# Patient Record
Sex: FEMALE | Race: WHITE | NOT HISPANIC OR LATINO | URBAN - METROPOLITAN AREA
[De-identification: names, ages, dates, MRNs, and addresses within clinical notes are randomized per-mention and may not be internally consistent; named-entity substitution may affect disease eponyms.]

---

## 2017-01-01 ENCOUNTER — AMBULATORY - HEALTHEAST (OUTPATIENT)
Dept: LAB | Facility: CLINIC | Age: 82
End: 2017-01-01

## 2017-01-01 ENCOUNTER — COMMUNICATION - HEALTHEAST (OUTPATIENT)
Dept: NURSING | Facility: CLINIC | Age: 82
End: 2017-01-01

## 2017-01-01 ENCOUNTER — COMMUNICATION - HEALTHEAST (OUTPATIENT)
Dept: FAMILY MEDICINE | Facility: CLINIC | Age: 82
End: 2017-01-01

## 2017-01-01 ENCOUNTER — OFFICE VISIT - HEALTHEAST (OUTPATIENT)
Dept: FAMILY MEDICINE | Facility: CLINIC | Age: 82
End: 2017-01-01

## 2017-01-01 ENCOUNTER — AMBULATORY - HEALTHEAST (OUTPATIENT)
Dept: NURSING | Facility: CLINIC | Age: 82
End: 2017-01-01

## 2017-01-01 ENCOUNTER — AMBULATORY - HEALTHEAST (OUTPATIENT)
Dept: FAMILY MEDICINE | Facility: CLINIC | Age: 82
End: 2017-01-01

## 2017-01-01 DIAGNOSIS — I48.91 ATRIAL FIBRILLATION (H): ICD-10-CM

## 2017-01-01 DIAGNOSIS — N18.30 CHRONIC KIDNEY DISEASE, STAGE III (MODERATE) (H): ICD-10-CM

## 2017-01-01 DIAGNOSIS — F43.21 SITUATIONAL DEPRESSION: ICD-10-CM

## 2017-01-01 DIAGNOSIS — E53.8 OTHER B-COMPLEX DEFICIENCIES: ICD-10-CM

## 2017-01-01 DIAGNOSIS — E78.2 MIXED HYPERLIPIDEMIA: ICD-10-CM

## 2017-01-01 DIAGNOSIS — D50.9 IRON DEFICIENCY ANEMIA, UNSPECIFIED: ICD-10-CM

## 2017-01-01 DIAGNOSIS — R60.9 EDEMA: ICD-10-CM

## 2017-01-01 DIAGNOSIS — I10 ESSENTIAL HYPERTENSION: ICD-10-CM

## 2017-01-01 DIAGNOSIS — R05.9 COUGH: ICD-10-CM

## 2017-01-01 DIAGNOSIS — N39.0 UTI (URINARY TRACT INFECTION): ICD-10-CM

## 2017-01-01 DIAGNOSIS — R63.4 WEIGHT LOSS: ICD-10-CM

## 2017-01-01 DIAGNOSIS — E87.6 HYPOPOTASSEMIA: ICD-10-CM

## 2017-01-01 DIAGNOSIS — M81.0 OSTEOPOROSIS, UNSPECIFIED: ICD-10-CM

## 2017-01-01 DIAGNOSIS — R44.1 HALLUCINATIONS, VISUAL: ICD-10-CM

## 2017-01-01 DIAGNOSIS — R19.7 DIARRHEA: ICD-10-CM

## 2017-01-01 DIAGNOSIS — H91.90 HEARING LOSS: ICD-10-CM

## 2017-01-01 DIAGNOSIS — E55.9 VITAMIN D DEFICIENCY: ICD-10-CM

## 2017-01-01 DIAGNOSIS — J18.9 PNEUMONIA: ICD-10-CM

## 2017-01-01 LAB
CHOLEST SERPL-MCNC: 156 MG/DL
FASTING STATUS PATIENT QL REPORTED: NORMAL
HDLC SERPL-MCNC: 59 MG/DL
LDLC SERPL CALC-MCNC: 84 MG/DL
TRIGL SERPL-MCNC: 64 MG/DL

## 2017-01-06 ENCOUNTER — AMBULATORY - HEALTHEAST (OUTPATIENT)
Dept: FAMILY MEDICINE | Facility: CLINIC | Age: 82
End: 2017-01-06

## 2017-01-06 ENCOUNTER — AMBULATORY - HEALTHEAST (OUTPATIENT)
Dept: LAB | Facility: CLINIC | Age: 82
End: 2017-01-06

## 2017-01-06 ENCOUNTER — COMMUNICATION - HEALTHEAST (OUTPATIENT)
Dept: FAMILY MEDICINE | Facility: CLINIC | Age: 82
End: 2017-01-06

## 2017-01-06 DIAGNOSIS — I48.91 A-FIB (H): ICD-10-CM

## 2017-01-09 ENCOUNTER — COMMUNICATION - HEALTHEAST (OUTPATIENT)
Dept: NURSING | Facility: CLINIC | Age: 82
End: 2017-01-09

## 2017-01-09 DIAGNOSIS — I48.91 ATRIAL FIBRILLATION (H): ICD-10-CM

## 2017-02-06 ENCOUNTER — COMMUNICATION - HEALTHEAST (OUTPATIENT)
Dept: NURSING | Facility: CLINIC | Age: 82
End: 2017-02-06

## 2017-02-06 ENCOUNTER — AMBULATORY - HEALTHEAST (OUTPATIENT)
Dept: LAB | Facility: CLINIC | Age: 82
End: 2017-02-06

## 2017-02-06 DIAGNOSIS — I48.91 ATRIAL FIBRILLATION (H): ICD-10-CM

## 2017-03-07 ENCOUNTER — AMBULATORY - HEALTHEAST (OUTPATIENT)
Dept: LAB | Facility: CLINIC | Age: 82
End: 2017-03-07

## 2017-03-07 ENCOUNTER — COMMUNICATION - HEALTHEAST (OUTPATIENT)
Dept: NURSING | Facility: CLINIC | Age: 82
End: 2017-03-07

## 2017-03-07 DIAGNOSIS — I48.91 ATRIAL FIBRILLATION (H): ICD-10-CM

## 2017-03-17 ENCOUNTER — COMMUNICATION - HEALTHEAST (OUTPATIENT)
Dept: NURSING | Facility: CLINIC | Age: 82
End: 2017-03-17

## 2017-03-17 ENCOUNTER — AMBULATORY - HEALTHEAST (OUTPATIENT)
Dept: LAB | Facility: CLINIC | Age: 82
End: 2017-03-17

## 2017-03-17 DIAGNOSIS — I48.91 ATRIAL FIBRILLATION (H): ICD-10-CM

## 2017-03-31 ENCOUNTER — COMMUNICATION - HEALTHEAST (OUTPATIENT)
Dept: NURSING | Facility: CLINIC | Age: 82
End: 2017-03-31

## 2017-03-31 ENCOUNTER — AMBULATORY - HEALTHEAST (OUTPATIENT)
Dept: LAB | Facility: CLINIC | Age: 82
End: 2017-03-31

## 2017-03-31 DIAGNOSIS — I48.91 ATRIAL FIBRILLATION (H): ICD-10-CM

## 2017-04-14 ENCOUNTER — COMMUNICATION - HEALTHEAST (OUTPATIENT)
Dept: NURSING | Facility: CLINIC | Age: 82
End: 2017-04-14

## 2017-04-14 ENCOUNTER — AMBULATORY - HEALTHEAST (OUTPATIENT)
Dept: LAB | Facility: CLINIC | Age: 82
End: 2017-04-14

## 2017-04-14 DIAGNOSIS — I48.91 ATRIAL FIBRILLATION (H): ICD-10-CM

## 2017-04-28 ENCOUNTER — AMBULATORY - HEALTHEAST (OUTPATIENT)
Dept: LAB | Facility: CLINIC | Age: 82
End: 2017-04-28

## 2017-04-28 ENCOUNTER — COMMUNICATION - HEALTHEAST (OUTPATIENT)
Dept: NURSING | Facility: CLINIC | Age: 82
End: 2017-04-28

## 2017-04-28 DIAGNOSIS — I48.91 ATRIAL FIBRILLATION (H): ICD-10-CM

## 2017-05-16 ENCOUNTER — OFFICE VISIT - HEALTHEAST (OUTPATIENT)
Dept: FAMILY MEDICINE | Facility: CLINIC | Age: 82
End: 2017-05-16

## 2017-05-16 DIAGNOSIS — Z79.01 CHRONIC ANTICOAGULATION: ICD-10-CM

## 2017-05-16 DIAGNOSIS — T07.XXXA MULTIPLE CONTUSIONS: ICD-10-CM
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
## 2017-05-16 DIAGNOSIS — S00.83XA TRAUMATIC ECCHYMOSIS OF FOREHEAD, INITIAL ENCOUNTER: ICD-10-CM

## 2017-05-19 ENCOUNTER — RECORDS - HEALTHEAST (OUTPATIENT)
Dept: ADMINISTRATIVE | Facility: OTHER | Age: 82
End: 2017-05-19

## 2017-05-24 ENCOUNTER — AMBULATORY - HEALTHEAST (OUTPATIENT)
Dept: LAB | Facility: CLINIC | Age: 82
End: 2017-05-24

## 2017-05-24 ENCOUNTER — COMMUNICATION - HEALTHEAST (OUTPATIENT)
Dept: NURSING | Facility: CLINIC | Age: 82
End: 2017-05-24

## 2017-05-24 DIAGNOSIS — I48.91 ATRIAL FIBRILLATION (H): ICD-10-CM

## 2017-06-05 ENCOUNTER — COMMUNICATION - HEALTHEAST (OUTPATIENT)
Dept: FAMILY MEDICINE | Facility: CLINIC | Age: 82
End: 2017-06-05

## 2017-06-05 DIAGNOSIS — I48.91 ATRIAL FIBRILLATION (H): ICD-10-CM

## 2017-06-07 ENCOUNTER — COMMUNICATION - HEALTHEAST (OUTPATIENT)
Dept: NURSING | Facility: CLINIC | Age: 82
End: 2017-06-07

## 2017-06-07 ENCOUNTER — AMBULATORY - HEALTHEAST (OUTPATIENT)
Dept: LAB | Facility: CLINIC | Age: 82
End: 2017-06-07

## 2017-06-07 DIAGNOSIS — I48.91 ATRIAL FIBRILLATION (H): ICD-10-CM

## 2017-06-14 ENCOUNTER — RECORDS - HEALTHEAST (OUTPATIENT)
Dept: ADMINISTRATIVE | Facility: OTHER | Age: 82
End: 2017-06-14

## 2017-06-21 ENCOUNTER — COMMUNICATION - HEALTHEAST (OUTPATIENT)
Dept: FAMILY MEDICINE | Facility: CLINIC | Age: 82
End: 2017-06-21

## 2017-06-21 DIAGNOSIS — K21.9 ESOPHAGEAL REFLUX: ICD-10-CM

## 2017-06-21 DIAGNOSIS — E78.5 HYPERLIPIDEMIA: ICD-10-CM

## 2017-06-21 DIAGNOSIS — R60.9 EDEMA: ICD-10-CM

## 2017-06-21 DIAGNOSIS — I10 HYPERTENSION: ICD-10-CM

## 2017-06-28 ENCOUNTER — COMMUNICATION - HEALTHEAST (OUTPATIENT)
Dept: NURSING | Facility: CLINIC | Age: 82
End: 2017-06-28

## 2017-06-28 ENCOUNTER — AMBULATORY - HEALTHEAST (OUTPATIENT)
Dept: LAB | Facility: CLINIC | Age: 82
End: 2017-06-28

## 2017-06-28 DIAGNOSIS — I48.91 ATRIAL FIBRILLATION (H): ICD-10-CM

## 2017-07-03 ENCOUNTER — COMMUNICATION - HEALTHEAST (OUTPATIENT)
Dept: FAMILY MEDICINE | Facility: CLINIC | Age: 82
End: 2017-07-03

## 2017-07-03 DIAGNOSIS — I10 UNSPECIFIED ESSENTIAL HYPERTENSION: ICD-10-CM

## 2017-07-23 ENCOUNTER — COMMUNICATION - HEALTHEAST (OUTPATIENT)
Dept: FAMILY MEDICINE | Facility: CLINIC | Age: 82
End: 2017-07-23

## 2017-07-23 DIAGNOSIS — F43.21 SITUATIONAL DEPRESSION: ICD-10-CM

## 2017-07-26 ENCOUNTER — RECORDS - HEALTHEAST (OUTPATIENT)
Dept: ADMINISTRATIVE | Facility: OTHER | Age: 82
End: 2017-07-26

## 2018-01-01 ENCOUNTER — AMBULATORY - HEALTHEAST (OUTPATIENT)
Dept: GERIATRICS | Facility: CLINIC | Age: 83
End: 2018-01-01

## 2018-01-01 ENCOUNTER — OFFICE VISIT - HEALTHEAST (OUTPATIENT)
Dept: GERIATRICS | Facility: CLINIC | Age: 83
End: 2018-01-01

## 2018-01-01 ENCOUNTER — HOME CARE/HOSPICE - HEALTHEAST (OUTPATIENT)
Dept: HOME HEALTH SERVICES | Facility: HOME HEALTH | Age: 83
End: 2018-01-01

## 2018-01-01 ENCOUNTER — COMMUNICATION - HEALTHEAST (OUTPATIENT)
Dept: FAMILY MEDICINE | Facility: CLINIC | Age: 83
End: 2018-01-01

## 2018-01-01 ENCOUNTER — HOME CARE/HOSPICE - HEALTHEAST (OUTPATIENT)
Dept: HOSPICE | Facility: HOSPICE | Age: 83
End: 2018-01-01

## 2018-01-01 ENCOUNTER — COMMUNICATION - HEALTHEAST (OUTPATIENT)
Dept: NURSING | Facility: CLINIC | Age: 83
End: 2018-01-01

## 2018-01-01 ENCOUNTER — AMBULATORY - HEALTHEAST (OUTPATIENT)
Dept: ADMINISTRATIVE | Facility: CLINIC | Age: 83
End: 2018-01-01

## 2018-01-01 ENCOUNTER — OFFICE VISIT - HEALTHEAST (OUTPATIENT)
Dept: FAMILY MEDICINE | Facility: CLINIC | Age: 83
End: 2018-01-01

## 2018-01-01 ENCOUNTER — RECORDS - HEALTHEAST (OUTPATIENT)
Dept: ADMINISTRATIVE | Facility: OTHER | Age: 83
End: 2018-01-01

## 2018-01-01 ENCOUNTER — AMBULATORY - HEALTHEAST (OUTPATIENT)
Dept: LAB | Facility: CLINIC | Age: 83
End: 2018-01-01

## 2018-01-01 ENCOUNTER — AMBULATORY - HEALTHEAST (OUTPATIENT)
Dept: NURSING | Facility: CLINIC | Age: 83
End: 2018-01-01

## 2018-01-01 ENCOUNTER — COMMUNICATION - HEALTHEAST (OUTPATIENT)
Dept: CARE COORDINATION | Facility: CLINIC | Age: 83
End: 2018-01-01

## 2018-01-01 ENCOUNTER — COMMUNICATION - HEALTHEAST (OUTPATIENT)
Dept: GERIATRICS | Facility: CLINIC | Age: 83
End: 2018-01-01

## 2018-01-01 ENCOUNTER — COMMUNICATION - HEALTHEAST (OUTPATIENT)
Dept: HOME HEALTH SERVICES | Facility: HOME HEALTH | Age: 83
End: 2018-01-01

## 2018-01-01 DIAGNOSIS — R44.1 HALLUCINATIONS, VISUAL: ICD-10-CM

## 2018-01-01 DIAGNOSIS — F02.818 LATE ONSET ALZHEIMER'S DISEASE WITH BEHAVIORAL DISTURBANCE (H): ICD-10-CM

## 2018-01-01 DIAGNOSIS — I10 ESSENTIAL HYPERTENSION: ICD-10-CM

## 2018-01-01 DIAGNOSIS — E78.2 MIXED HYPERLIPIDEMIA: ICD-10-CM

## 2018-01-01 DIAGNOSIS — E87.6 HYPOPOTASSEMIA: ICD-10-CM

## 2018-01-01 DIAGNOSIS — I48.20 CHRONIC ATRIAL FIBRILLATION (H): ICD-10-CM

## 2018-01-01 DIAGNOSIS — F43.21 SITUATIONAL DEPRESSION: ICD-10-CM

## 2018-01-01 DIAGNOSIS — G30.1 LATE ONSET ALZHEIMER'S DISEASE WITH BEHAVIORAL DISTURBANCE (H): ICD-10-CM

## 2018-01-01 DIAGNOSIS — I48.91 ATRIAL FIBRILLATION (H): ICD-10-CM
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
## 2018-01-01 DIAGNOSIS — G47.00 INSOMNIA: ICD-10-CM

## 2018-01-01 DIAGNOSIS — R19.7 DIARRHEA, UNSPECIFIED TYPE: ICD-10-CM

## 2018-01-01 DIAGNOSIS — K21.9 GERD (GASTROESOPHAGEAL REFLUX DISEASE): ICD-10-CM

## 2018-01-01 DIAGNOSIS — F99 INSOMNIA DUE TO OTHER MENTAL DISORDER: ICD-10-CM

## 2018-01-01 DIAGNOSIS — F32.A DEPRESSION: ICD-10-CM

## 2018-01-01 DIAGNOSIS — R05.9 COUGH: ICD-10-CM

## 2018-01-01 DIAGNOSIS — N18.30 CHRONIC KIDNEY DISEASE, STAGE III (MODERATE) (H): ICD-10-CM

## 2018-01-01 DIAGNOSIS — R60.9 EDEMA: ICD-10-CM

## 2018-01-01 DIAGNOSIS — K21.9 ESOPHAGEAL REFLUX: ICD-10-CM

## 2018-01-01 DIAGNOSIS — F51.05 INSOMNIA DUE TO OTHER MENTAL DISORDER: ICD-10-CM

## 2018-01-01 DIAGNOSIS — H91.90 HEARING LOSS: ICD-10-CM

## 2018-01-01 LAB
ALBUMIN SERPL-MCNC: 3.4 G/DL (ref 3.5–5)
ALP SERPL-CCNC: 82 U/L (ref 45–120)
ALT SERPL W P-5'-P-CCNC: 15 U/L (ref 0–45)
ANION GAP SERPL CALCULATED.3IONS-SCNC: 14 MMOL/L (ref 5–18)
AST SERPL W P-5'-P-CCNC: 20 U/L (ref 0–40)
BASOPHILS # BLD AUTO: 0 THOU/UL (ref 0–0.2)
BASOPHILS NFR BLD AUTO: 0 % (ref 0–2)
BILIRUB SERPL-MCNC: 0.6 MG/DL (ref 0–1)
BUN SERPL-MCNC: 36 MG/DL (ref 8–28)
CALCIUM SERPL-MCNC: 9.1 MG/DL (ref 8.5–10.5)
CHLORIDE BLD-SCNC: 101 MMOL/L (ref 98–107)
CO2 SERPL-SCNC: 29 MMOL/L (ref 22–31)
CREAT SERPL-MCNC: 1.26 MG/DL (ref 0.6–1.1)
EOSINOPHIL # BLD AUTO: 0.1 THOU/UL (ref 0–0.4)
EOSINOPHIL NFR BLD AUTO: 2 % (ref 0–6)
ERYTHROCYTE [DISTWIDTH] IN BLOOD BY AUTOMATED COUNT: 13.6 % (ref 11–14.5)
GFR SERPL CREATININE-BSD FRML MDRD: 39 ML/MIN/1.73M2
GLUCOSE BLD-MCNC: 96 MG/DL (ref 70–125)
HCT VFR BLD AUTO: 35.1 % (ref 35–47)
HGB BLD-MCNC: 11.3 G/DL (ref 12–16)
INR PPP: 1.7 (ref 0.9–1.1)
INR PPP: 3.1 (ref 0.9–1.1)
INR PPP: 3.5 (ref 0.9–1.1)
LYMPHOCYTES # BLD AUTO: 1 THOU/UL (ref 0.8–4.4)
LYMPHOCYTES NFR BLD AUTO: 18 % (ref 20–40)
MAGNESIUM SERPL-MCNC: 2.2 MG/DL (ref 1.8–2.6)
MCH RBC QN AUTO: 27.7 PG (ref 27–34)
MCHC RBC AUTO-ENTMCNC: 32.1 G/DL (ref 32–36)
MCV RBC AUTO: 86 FL (ref 80–100)
MONOCYTES # BLD AUTO: 0.5 THOU/UL (ref 0–0.9)
MONOCYTES NFR BLD AUTO: 10 % (ref 2–10)
NEUTROPHILS # BLD AUTO: 3.9 THOU/UL (ref 2–7.7)
NEUTROPHILS NFR BLD AUTO: 70 % (ref 50–70)
PHOSPHATE SERPL-MCNC: 3.5 MG/DL (ref 2.5–4.5)
PLATELET # BLD AUTO: 276 THOU/UL (ref 140–440)
PMV BLD AUTO: 7.3 FL (ref 7–10)
POTASSIUM BLD-SCNC: 3.9 MMOL/L (ref 3.5–5)
PROT SERPL-MCNC: 6.8 G/DL (ref 6–8)
RBC # BLD AUTO: 4.07 MILL/UL (ref 3.8–5.4)
SODIUM SERPL-SCNC: 144 MMOL/L (ref 136–145)
WBC: 5.5 THOU/UL (ref 4–11)

## 2018-01-01 RX ORDER — DULOXETIN HYDROCHLORIDE 30 MG/1
30 CAPSULE, DELAYED RELEASE ORAL EVERY EVENING
Status: SHIPPED | COMMUNITY
Start: 2018-01-01

## 2018-01-01 RX ORDER — FUROSEMIDE 40 MG
40 TABLET ORAL 2 TIMES DAILY
Qty: 60 TABLET | Refills: 0 | Status: SHIPPED
Start: 2018-01-01

## 2018-01-01 RX ORDER — METOPROLOL SUCCINATE 25 MG/1
25 TABLET, EXTENDED RELEASE ORAL DAILY
Qty: 30 TABLET | Refills: 0 | Status: SHIPPED
Start: 2018-01-01

## 2018-01-01 RX ORDER — RISPERIDONE 0.5 MG/1
0.5 TABLET ORAL 2 TIMES DAILY
Status: SHIPPED | COMMUNITY
Start: 2018-01-01

## 2018-01-01 RX ORDER — NIFEDIPINE 30 MG
30 TABLET, EXTENDED RELEASE ORAL DAILY
Status: SHIPPED | COMMUNITY
Start: 2018-01-01

## 2018-01-01 RX ORDER — LOPERAMIDE HCL 2 MG
2 CAPSULE ORAL 4 TIMES DAILY PRN
Refills: 0 | Status: SHIPPED | COMMUNITY
Start: 2018-01-01

## 2018-01-01 RX ORDER — POTASSIUM CHLORIDE 750 MG/1
10 TABLET, EXTENDED RELEASE ORAL 2 TIMES DAILY
Status: SHIPPED | COMMUNITY
Start: 2018-01-01

## 2018-01-01 RX ORDER — TRAZODONE HYDROCHLORIDE 100 MG/1
100 TABLET ORAL AT BEDTIME
Status: SHIPPED | COMMUNITY
Start: 2018-01-01

## 2018-01-01 RX ORDER — DIVALPROEX SODIUM 125 MG/1
125 CAPSULE, COATED PELLETS ORAL 3 TIMES DAILY
Status: SHIPPED | COMMUNITY
Start: 2018-01-01

## 2018-01-01 RX ORDER — GABAPENTIN 100 MG/1
100 CAPSULE ORAL 3 TIMES DAILY
Status: SHIPPED | COMMUNITY
Start: 2018-01-01

## 2018-01-01 ASSESSMENT — MIFFLIN-ST. JEOR: SCORE: 892.07

## 2021-05-26 ENCOUNTER — RECORDS - HEALTHEAST (OUTPATIENT)
Dept: ADMINISTRATIVE | Facility: CLINIC | Age: 86
End: 2021-05-26

## 2021-05-27 ENCOUNTER — RECORDS - HEALTHEAST (OUTPATIENT)
Dept: ADMINISTRATIVE | Facility: CLINIC | Age: 86
End: 2021-05-27

## 2021-05-28 ENCOUNTER — RECORDS - HEALTHEAST (OUTPATIENT)
Dept: ADMINISTRATIVE | Facility: CLINIC | Age: 86
End: 2021-05-28

## 2021-05-31 VITALS — BODY MASS INDEX: 23.21 KG/M2 | WEIGHT: 131 LBS

## 2021-05-31 VITALS — WEIGHT: 123.6 LBS | BODY MASS INDEX: 21.89 KG/M2

## 2021-05-31 VITALS — WEIGHT: 133 LBS | BODY MASS INDEX: 23.56 KG/M2

## 2021-05-31 VITALS — BODY MASS INDEX: 22.76 KG/M2 | WEIGHT: 128.5 LBS

## 2021-05-31 VITALS — WEIGHT: 133.3 LBS | BODY MASS INDEX: 23.61 KG/M2

## 2021-06-01 VITALS — BODY MASS INDEX: 22.87 KG/M2 | HEIGHT: 62 IN | WEIGHT: 124.3 LBS

## 2021-06-01 VITALS — BODY MASS INDEX: 23.13 KG/M2 | WEIGHT: 126.44 LBS

## 2021-06-01 VITALS — BODY MASS INDEX: 23.41 KG/M2 | WEIGHT: 128 LBS

## 2021-06-01 VITALS — WEIGHT: 145.8 LBS | BODY MASS INDEX: 26.67 KG/M2

## 2021-06-01 VITALS — WEIGHT: 142.4 LBS | BODY MASS INDEX: 26.05 KG/M2

## 2021-06-01 VITALS — BODY MASS INDEX: 23.01 KG/M2 | WEIGHT: 125.8 LBS

## 2021-06-03 ENCOUNTER — RECORDS - HEALTHEAST (OUTPATIENT)
Dept: ADMINISTRATIVE | Facility: CLINIC | Age: 86
End: 2021-06-03

## 2021-06-10 NOTE — PROGRESS NOTES
"ASSESSMENT/PLAN:  1. Traumatic ecchymosis of forehead, initial encounter     2. Multiple contusions     3. Fall, initial encounter     4. Chronic anticoagulation         This is a 89 yo female with recent fall at home.  No LOC.  Patient is on coumadin therapy and INR was therapeutic.  Seen in [facility] ER 5/12 - had scans and xrays done - these were reviewed and results reviewed.  Exam would be consistent with a fall - traumatic injury to left forehead - with large lump; also multiple contusions on body - in splint on left forearm/wrist due to presumptive left distal radial fracture.  Patient has appointment with Ortho to address left forearm - will continue with splint until then        There are no discontinued medications.  There are no Patient Instructions on file for this visit.    Chief Complaint:  Chief Complaint   Patient presents with     Hospital Visit Follow Up     fall on 5/12 and was taken to [facility]        HPI:   Fang Villasenor is a 89 y.o. female c/o  Fall on 5/12 - at home - wasn't using an assistive device   Was just transferring across her room  Was talking to daughter on phone and  Daughter heard a scream  She had lost balance, tripped and fell  Seen in ER at [facility] - has appointment to see Ortho  Feels \"okay\" right now, just embarrassed by her bruising.        PMH:   Patient Active Problem List    Diagnosis Date Noted     Chronic anticoagulation 05/29/2017     Situational depression 07/01/2016     Decubitus ulcer, hip, left, stage I 02/14/2016     Unintentional weight loss 12/04/2015     Allergic rhinitis 05/18/2015     Seasonal allergies 05/11/2015     Closed Subtrochanteric Fracture Of The Left Femur      Onychomycosis Of The Toenails      Edema      Iron Deficiency      Anemia      Fibroadenoma Of The Breast      Cataract      Urinary Tract Infection      Atypical Chest Pain      Menopausal Disorder      Vitamin B12 Deficiency      Vitamin D Deficiency      Hyperlipidemia      " Hypokalemia      Hypertension      Chronic Kidney Disease, Stage 3      Osteoporosis      Open Wound Of The Forearm      A Fall      Open Wound Of The Right Upper Arm      Esophageal Reflux      Helicobacter Pylori (H. Pylori) Infection      Atrial Fibrillation      Mitral Regurgitation      Lumbar Spondylosis (L4 - L5)      Spinal Stenosis      Past Medical History:   Diagnosis Date     Atrial fibrillation      Past Surgical History:   Procedure Laterality Date     AR APPENDECTOMY      Description: Appendectomy;  Recorded: 04/27/2007;     AR OPEN TREATMENT GREATER TROCHANTERIC FRACTURE      Description: Open Treatment Of A Fracture Of The Greater Trochanter;  Proc Date: 01/01/2014;     AR REMOVAL GALLBLADDER      Description: Cholecystectomy;  Recorded: 01/12/2009;     Social History     Social History     Marital status: Single     Spouse name: N/A     Number of children: N/A     Years of education: N/A     Occupational History     Not on file.     Social History Main Topics     Smoking status: Former Smoker     Smokeless tobacco: Not on file     Alcohol use Not on file     Drug use: Not on file     Sexual activity: Not on file     Other Topics Concern     Not on file     Social History Narrative       Meds:    Current Outpatient Prescriptions:      calcium carbonate-vitamin D3 (CALTRATE 600 PLUS D3) 600 mg(1,500mg) -400 unit per tablet, TAKE 1 TABLET BY MOUTH 2 TIMES A DAY., Disp: 180 tablet, Rfl: 3     furosemide (LASIX) 40 MG tablet, TAKE 1 TABLET BY MOUTH 2 TIMES A DAY, Disp: 180 tablet, Rfl: 3     metoprolol succinate (TOPROL-XL) 50 MG 24 hr tablet, TAKE 1 TABLET BY MOUTH DAILY, Disp: 90 tablet, Rfl: 3     NIFEdipine (PROCARDIA XL) 30 MG 24 hr tablet, TAKE 1 TABLET BY MOUTH AT BEDTIME, Disp: 90 tablet, Rfl: 3     omeprazole (PRILOSEC) 20 MG capsule, TAKE 1 CAPSULE (20 MG) BY MOUTH ONCE DAILY, Disp: 90 capsule, Rfl: 3     ranitidine (ZANTAC) 150 MG tablet, Take 1 tablet (150 mg total) by mouth 2 (two) times a  day., Disp: 180 tablet, Rfl: 3     simvastatin (ZOCOR) 40 MG tablet, TAKE 1 TABLET (40 MG TOTAL) BY MOUTH BEDTIME., Disp: 90 tablet, Rfl: 3     warfarin (COUMADIN) 2.5 MG tablet, TAKE 1 TO 2 TABLETS BY MOUTH DAILY AS DIRECTED PER INR CHECKS., Disp: 180 tablet, Rfl: 3     escitalopram oxalate (LEXAPRO) 10 MG tablet, Take 1 tablet (10 mg total) by mouth daily., Disp: 90 tablet, Rfl: 3    Current Facility-Administered Medications:      pneumococcal conj. 13-valent vaccine 0.5 mL (PREVNAR-13), 0.5 mL, Intramuscular, Once, Kathy Vyas MD    Allergies:  Allergies   Allergen Reactions     Amlodipine Besylate      Annotation: Pt denies being allergic to any medications.         ROS:  Pertinent positives as noted in HPI; otherwise 12 point ROS negative.      Physical Exam:  EXAM:  /64 (Patient Site: Right Arm, Patient Position: Sitting, Cuff Size: Adult Regular)  Pulse 72  Resp 20  Wt 131 lb (59.4 kg)  BMI 23.21 kg/m2   Gen:  NAD, appears well, well-hydrated, good spirits, mentation baseline  HEENT:  TMs nl, oropharynx benign, nasal mucosa nl, conjunctiva clear, EOMI  Neck:  Supple, no adenopathy, no thyromegaly, no carotid bruits, no JVD  Lungs:  Clear to auscultation bilaterally  Cor:  RRR no murmur  Abd:  Soft, nontender, BS+, no masses, no guarding or rebound, no HSM  Extr:  Left wrist immobilized in splint - distal CMS is intact  Neuro:  No asymmetry, Nl motor tone/strength, nl sensation, reflexes =, gait ataxic (normal for patient), nl coordination, CN intact,   Skin:  Warm/dry, large ecchymosis on left forehead measuring 7x4 cm and 0.8- 1.0 cm high;         Results:  Results for orders placed or performed during the hospital encounter of 05/12/17   Culture, Urine   Result Value Ref Range    Culture Mixture of urogenital organisms    INR   Result Value Ref Range    INR 2.06 (H) 0.90 - 1.10   Comprehensive Metabolic Panel   Result Value Ref Range    Sodium 143 136 - 145 mmol/L    Potassium 3.9  3.5 - 5.0 mmol/L    Chloride 101 98 - 107 mmol/L    CO2 33 (H) 22 - 31 mmol/L    Anion Gap, Calculation 9 5 - 18 mmol/L    Glucose 120 70 - 125 mg/dL    BUN 25 8 - 28 mg/dL    Creatinine 1.33 (H) 0.60 - 1.10 mg/dL    GFR MDRD Af Amer 45 (L) >60 mL/min/1.73m2    GFR MDRD Non Af Amer 37 (L) >60 mL/min/1.73m2    Bilirubin, Total 0.7 0.0 - 1.0 mg/dL    Calcium 9.1 8.5 - 10.5 mg/dL    Protein, Total 7.1 6.0 - 8.0 g/dL    Albumin 3.4 (L) 3.5 - 5.0 g/dL    Alkaline Phosphatase 97 45 - 120 U/L    AST 18 0 - 40 U/L    ALT 12 0 - 45 U/L   Urinalysis-UC if Indicated   Result Value Ref Range    Color, UA Yellow Colorless, Yellow, Straw, Light Yellow    Clarity, UA Clear Clear    Glucose, UA Negative Negative    Bilirubin, UA Negative Negative    Ketones, UA Negative Negative, 60 mg/dL    Specific Gravity, UA 1.005 1.001 - 1.030    Blood, UA Negative Negative    pH, UA 7.0 4.5 - 8.0    Protein, UA Negative Negative mg/dL    Urobilinogen, UA <2.0 E.U./dL <2.0 E.U./dL, 2.0 E.U./dL    Nitrite, UA Negative Negative    Leukocytes, UA Moderate (!) Negative    Bacteria, UA Few (!) None Seen hpf    RBC, UA 0-2 None Seen, 0-2 hpf    WBC, UA 10-25 (!) None Seen, 0-5 hpf    Squam Epithel, UA  (!) None Seen, 0-5 lpf    Hyaline Casts, UA 0-5 0-5, None Seen lpf   HM1 (CBC with Diff)   Result Value Ref Range    WBC 6.9 4.0 - 11.0 thou/uL    RBC 4.14 3.80 - 5.40 mill/uL    Hemoglobin 11.6 (L) 12.0 - 16.0 g/dL    Hematocrit 36.7 35.0 - 47.0 %    MCV 89 80 - 100 fL    MCH 28.0 27.0 - 34.0 pg    MCHC 31.6 (L) 32.0 - 36.0 g/dL    RDW 14.0 11.0 - 14.5 %    Platelets 236 140 - 440 thou/uL    MPV 9.5 8.5 - 12.5 fL    Neutrophils % 75 (H) 50 - 70 %    Lymphocytes % 16 (L) 20 - 40 %    Monocytes % 7 2 - 10 %    Eosinophils % 1 0 - 6 %    Basophils % 0 0 - 2 %    Neutrophils Absolute 5.2 2.0 - 7.7 thou/uL    Lymphocytes Absolute 1.1 0.8 - 4.4 thou/uL    Monocytes Absolute 0.5 0.0 - 0.9 thou/uL    Eosinophils Absolute 0.1 0.0 - 0.4 thou/uL     Basophils Absolute 0.0 0.0 - 0.2 thou/uL     [facility]  CT HEAD WO CONTRAST, CT FACIAL BONES WO CONTRAST  5/12/2017 8:39 PM     INDICATION: Headache, post trauma.  TECHNIQUE: Routine CT head and face. Dose reduction techniques were used.  CONTRAST: None.  COMPARISON:  None.     FINDINGS: Large left frontal scalp hematoma extending to the left periorbital region. No acute intracranial hemorrhage, midline shift, or mass effect. No definite CT evidence of acute infarct. Mild to moderate cerebral and mild cerebellar volume loss.   Mild burden presumed chronic small vessel ischemia.      FACE CT: Left forehead and paraorbital soft tissue swelling. No acute facial fracture. The orbital floors and maxillary sinus walls are intact. No intraorbital retrobulbar hematoma. Tiny mucus retention cyst left sphenoid locule. The visualized paranasal   sinuses and mastoid air cells are otherwise clear.     IMPRESSION:   CONCLUSION:  HEAD CT:  1.  Large left frontal and periorbital soft tissue hematoma. No acute hemorrhage midline shift or mass effect.     2.  Age-related changes.     FACE CT:  1.  No facial fracture.     XR WRIST LEFT 3 OR MORE VWS  5/12/2017 8:42 PM     INDICATION: Wrist pain. Fall.  COMPARISON: None.     FINDINGS: There is osteopenia. Lucency along the dorsal cortex of the distal radial metaphysis could represent a nondisplaced fracture. Soft tissue swelling in this region supports a fracture. Otherwise no fractures identified. Minimal degenerative   changes noted. There is vascular calcification.

## 2021-06-12 NOTE — PROGRESS NOTES
"ASSESSMENT/PLAN:  1. Hallucinations, visual  Urinalysis    Culture, Urine   2. Vitamin D deficiency  Vitamin D, Total (25-Hydroxy)   3. Vitamin B12 Deficiency  Vitamin B12   4. Weight loss  HM1(CBC and Differential)    Comprehensive Metabolic Panel    Thyroid Stimulating Hormone (TSH)    HM1 (CBC with Diff)   5. Mixed hyperlipidemia  Lipid Profile   6. Iron Deficiency     7. Atrial fibrillation  INR   8. UTI (urinary tract infection)  amoxicillin (AMOXIL) 500 MG capsule       This is a 89 yo female who lives in the lower level of her daughter (and son-in-law's) home.  Both patient and daughter are seen today with concern for patient's ability to remain in this situation.  There is some report of at least visual hallucinations - patient seens (?and hears?) people who are not actually present.  She is quite annoyed by this.  Unfortunately, her daughter admits to feeling overwhelmed by the whole situation - doesn't know how long she can last with this situation.  Patient doesn't want LTC, daughter doesn't know what she can do - is hoping we can find a \"quick fix\" to this.  We discussed that sometimes failing health can contribute - there are occasional times where hearing loss can contribute to these reported hallucinations.  Also - it may be that she has a UTI and treatment could help.  We reviewed labs today - will treat presumptively for UTI with Amoxicillin (patient is on coumadin - want to avoid other traditional urinary antibiotics).     We will check labs regarding other underlying conditions to make sure there is stability in these as well.  Discussed warning signs/symptoms that should prompt more urgent re-evaluation.         There are no discontinued medications.  There are no Patient Instructions on file for this visit.    Chief Complaint:  Chief Complaint   Patient presents with     Hallucinations       HPI:   Fang Villasenor is a 89 y.o. female c/o  \"Everybody makes mistakes\"  \"I'm almost 89 years old - " "I say stupid things - and then it gets picked up on and I'm dumb\"  Has been imagining that people are all over the house - and then crying all the time  In last 1-1/2 to 2 weeks -   Sees people in her home (that aren't there)  Bad last night    No new medicine  Gets same medicine every day    Feels miserable -   \"I thought I'd be happy until I \"  Has lost 17 pounds since May    No fever, no chills  No dysuria,  No diarrhea, no constipation          PMH:   Patient Active Problem List    Diagnosis Date Noted     Hallucinations, visual 2017     Chronic anticoagulation 2017     Situational depression 2016     Decubitus ulcer, hip, left, stage I 2016     Unintentional weight loss 2015     Allergic rhinitis 2015     Seasonal allergies 2015     Closed Subtrochanteric Fracture Of The Left Femur      Onychomycosis Of The Toenails      Edema      Iron Deficiency      Anemia      Fibroadenoma Of The Breast      Cataract      Urinary Tract Infection      Atypical Chest Pain      Menopausal Disorder      Vitamin B12 Deficiency      Vitamin D Deficiency      Mixed hyperlipidemia      Hypokalemia      Hypertension      Chronic Kidney Disease, Stage 3      Osteoporosis      Open Wound Of The Forearm      A Fall      Open Wound Of The Right Upper Arm      Esophageal Reflux      Helicobacter Pylori (H. Pylori) Infection      Atrial Fibrillation      Mitral Regurgitation      Lumbar Spondylosis (L4 - L5)      Spinal Stenosis      Past Medical History:   Diagnosis Date     Atrial fibrillation      Past Surgical History:   Procedure Laterality Date     MI APPENDECTOMY      Description: Appendectomy;  Recorded: 2007;     MI OPEN TREATMENT GREATER TROCHANTERIC FRACTURE      Description: Open Treatment Of A Fracture Of The Greater Trochanter;  Proc Date: 2014;     MI REMOVAL GALLBLADDER      Description: Cholecystectomy;  Recorded: 2009;     Social History     Social History "     Marital status: Single     Spouse name: N/A     Number of children: N/A     Years of education: N/A     Occupational History     Not on file.     Social History Main Topics     Smoking status: Former Smoker     Smokeless tobacco: Never Used     Alcohol use Not on file     Drug use: Not on file     Sexual activity: Not on file     Other Topics Concern     Not on file     Social History Narrative       Meds:    Current Outpatient Prescriptions:      calcium carbonate-vitamin D3 (CALTRATE 600 PLUS D3) 600 mg(1,500mg) -400 unit per tablet, TAKE 1 TABLET BY MOUTH 2 TIMES A DAY., Disp: 180 tablet, Rfl: 3     escitalopram oxalate (LEXAPRO) 10 MG tablet, TAKE 1 TABLET BY MOUTH DAILY, Disp: 90 tablet, Rfl: 0     furosemide (LASIX) 40 MG tablet, TAKE 1 TABLET BY MOUTH 2 TIMES A DAY, Disp: 180 tablet, Rfl: 2     metoprolol succinate (TOPROL-XL) 50 MG 24 hr tablet, TAKE 1 TABLET BY MOUTH DAILY, Disp: 90 tablet, Rfl: 2     NIFEdipine (PROCARDIA XL) 30 MG 24 hr tablet, TAKE 1 TABLET BY MOUTH AT BEDTIME, Disp: 90 tablet, Rfl: 3     omeprazole (PRILOSEC) 20 MG capsule, TAKE 1 CAPSULE (20 MG) BY MOUTH ONCE DAILY, Disp: 90 capsule, Rfl: 2     simvastatin (ZOCOR) 40 MG tablet, TAKE 1 TABLET BY MOUTH AT BEDTIME, Disp: 90 tablet, Rfl: 2     warfarin (COUMADIN) 2.5 MG tablet, TAKE 1 TO 2 TABLETS BY MOUTH DAILY AS DIRECTED PER INR CHECKS., Disp: 180 tablet, Rfl: 3     potassium chloride (MICRO-K) 10 mEq CR capsule, Take 1 capsule (10 mEq total) by mouth daily., Disp: 30 capsule, Rfl: 3     ranitidine (ZANTAC) 150 MG tablet, Take 1 tablet (150 mg total) by mouth 2 (two) times a day., Disp: 180 tablet, Rfl: 3    Current Facility-Administered Medications:      pneumococcal conj. 13-valent vaccine 0.5 mL (PREVNAR-13), 0.5 mL, Intramuscular, Once, Kathy Vyas MD    Allergies:  Allergies   Allergen Reactions     Amlodipine Besylate      Annotation: Pt denies being allergic to any medications.         ROS:  Pertinent  positives as noted in HPI; otherwise 12 point ROS negative.      Physical Exam:  EXAM:  /60 (Patient Site: Left Arm, Patient Position: Sitting, Cuff Size: Adult Small)  Pulse 96  Temp 97.8  F (36.6  C) (Oral)   Resp 14  Wt 128 lb 8 oz (58.3 kg)  SpO2 95%  BMI 22.76 kg/m2   Gen:  NAD, appears well, well-hydrated, emotionally labile/stubborn  HEENT:  TMs nl, oropharynx benign, nasal mucosa nl, conjunctiva clear  Neck:  Supple, no adenopathy, no thyromegaly, no carotid bruits, no JVD  Lungs:  Clear to auscultation bilaterally  Cor:  RRR no murmur  Abd:  Soft, nontender, BS+, no masses, no guarding or rebound, no HSM, no CVAT  Extr:  Decreased ROM hips - walks with walker  Neuro:  No asymmetry  Skin:  Warm/dry        Results:  Results for orders placed or performed in visit on 08/30/17   Culture, Urine   Result Value Ref Range    Culture No Growth    Urinalysis   Result Value Ref Range    Color, UA Yellow Colorless, Yellow, Straw, Light Yellow    Clarity, UA Clear Clear    Glucose, UA Negative Negative    Bilirubin, UA Negative Negative    Ketones, UA Negative Negative    Specific Gravity, UA 1.015 1.005 - 1.030    Blood, UA Trace (!) Negative    pH, UA 7.0 5.0 - 8.0    Protein, UA Negative Negative mg/dL    Urobilinogen, UA 0.2 E.U./dL 0.2 E.U./dL, 1.0 E.U./dL    Nitrite, UA Negative Negative    Leukocytes, UA Small (!) Negative    Bacteria, UA Few (!) None Seen hpf    RBC, UA 3-5 (!) None Seen, 0-2 hpf    WBC, UA 5-10 (!) None Seen, 0-5 hpf    Squam Epithel, UA 5-10 (!) None Seen, 0-5 lpf    Mucus, UA Few (!) None Seen lpf   Comprehensive Metabolic Panel   Result Value Ref Range    Sodium 144 136 - 145 mmol/L    Potassium 3.1 (L) 3.5 - 5.0 mmol/L    Chloride 102 98 - 107 mmol/L    CO2 27 22 - 31 mmol/L    Anion Gap, Calculation 15 5 - 18 mmol/L    Glucose 111 70 - 125 mg/dL    BUN 27 8 - 28 mg/dL    Creatinine 1.43 (H) 0.60 - 1.10 mg/dL    GFR MDRD Af Amer 41 (L) >60 mL/min/1.73m2    GFR MDRD Non Af Amer  34 (L) >60 mL/min/1.73m2    Bilirubin, Total 1.0 0.0 - 1.0 mg/dL    Calcium 9.5 8.5 - 10.5 mg/dL    Protein, Total 7.2 6.0 - 8.0 g/dL    Albumin 4.0 3.5 - 5.0 g/dL    Alkaline Phosphatase 81 45 - 120 U/L    AST 24 0 - 40 U/L    ALT 18 0 - 45 U/L   Vitamin D, Total (25-Hydroxy)   Result Value Ref Range    Vitamin D, Total (25-Hydroxy) 65.7 30.0 - 80.0 ng/mL   Vitamin B12   Result Value Ref Range    Vitamin B-12 526 213 - 816 pg/mL   Thyroid Stimulating Hormone (TSH)   Result Value Ref Range    TSH 1.40 0.30 - 5.00 uIU/mL   Lipid Profile   Result Value Ref Range    Triglycerides 64 <=149 mg/dL    Cholesterol 156 <=199 mg/dL    LDL Calculated 84 <=129 mg/dL    HDL Cholesterol 59 >=50 mg/dL    Patient Fasting > 8hrs? Unknown    HM1 (CBC with Diff)   Result Value Ref Range    WBC 7.0 4.0 - 11.0 thou/uL    RBC 4.59 3.80 - 5.40 mill/uL    Hemoglobin 12.7 12.0 - 16.0 g/dL    Hematocrit 38.2 35.0 - 47.0 %    MCV 83 80 - 100 fL    MCH 27.6 27.0 - 34.0 pg    MCHC 33.2 32.0 - 36.0 g/dL    RDW 13.3 11.0 - 14.5 %    Platelets 251 140 - 440 thou/uL    MPV 7.3 7.0 - 10.0 fL    Neutrophils % 74 (H) 50 - 70 %    Lymphocytes % 16 (L) 20 - 40 %    Monocytes % 8 2 - 10 %    Eosinophils % 2 0 - 6 %    Basophils % 0 0 - 2 %    Neutrophils Absolute 5.1 2.0 - 7.7 thou/uL    Lymphocytes Absolute 1.1 0.8 - 4.4 thou/uL    Monocytes Absolute 0.6 0.0 - 0.9 thou/uL    Eosinophils Absolute 0.1 0.0 - 0.4 thou/uL    Basophils Absolute 0.0 0.0 - 0.2 thou/uL   INR   Result Value Ref Range    INR 4.10 (H) 0.90 - 1.10

## 2021-06-14 NOTE — PROGRESS NOTES
ASSESSMENT/PLAN:  1. Edema  Compression stockings   2. Diarrhea  HM1(CBC and Differential)    Thyroid Stimulating Hormone (TSH)    HM1 (CBC with Diff)   3. Chronic Kidney Disease, Stage 3  Comprehensive Metabolic Panel   4. Atrial fibrillation     5. Hypertension     6. Hearing loss     7. Situational depression         This is a 89 yo female with:  1  Lower Extremity Edema - continue diuretic therapy.  Discussed need for compression - would recommend compression stockings.  Discussed with patient/daughter.  Daughter thought it would be easier to use ACE/elastic bandages.  When we brought these to her, she told my assistant that she'd rather have the stockings - rx written for this.  2.  Diarrhea - x several days - patient is concerned by this; no obvious etiology until daughter suggests that patient has been eating a lot of chocolate.  We will have her decrease her chocolate intake and see if this helps.  If not, may need further workup besides that completed today.  3.  H/o Chronic kidney disease - stage 3 - will check labs - this may contribute to LE edema  4.  Atrial Fib - continue coumadin; recheck INR  5.  Hearing loss - need referral for audiology   6..  Hypertension - blood pressure well controlled  7.  Situational depression - continue current medications treatment        There are no discontinued medications.  There are no Patient Instructions on file for this visit.    Chief Complaint:  Chief Complaint   Patient presents with     Leg Swelling     GI Problem     Referral     ent       HPI:   Fang Villasenor is a 89 y.o. female c/o  1.  Diarrhea - eating a lot of chocolate -   BMs are loose -   For quite a while  Has been happening more since eating a lot of chocolate  No travel, no recent antibiotics, only new med is potassium     2.  Leg swelling - really swollen   Trying to keep legs up as much as she can  Most of time - feet are up  Still taking Furosemide twice a day    3.  ENT - can't hear - needs a  hearing aid  Has to have TV up really loud             PMH:   Patient Active Problem List    Diagnosis Date Noted     Diarrhea 11/13/2017     Hearing loss 11/08/2017     Hallucinations, visual 08/30/2017     Chronic anticoagulation 05/29/2017     Situational depression 07/01/2016     Decubitus ulcer, hip, left, stage I 02/14/2016     Unintentional weight loss 12/04/2015     Allergic rhinitis 05/18/2015     Seasonal allergies 05/11/2015     Closed Subtrochanteric Fracture Of The Left Femur      Onychomycosis Of The Toenails      Edema      Iron Deficiency      Anemia      Fibroadenoma Of The Breast      Cataract      Urinary Tract Infection      Atypical Chest Pain      Menopausal Disorder      Vitamin B12 Deficiency      Vitamin D Deficiency      Mixed hyperlipidemia      Hypokalemia      Hypertension      Chronic Kidney Disease, Stage 3      Osteoporosis      Open Wound Of The Forearm      A Fall      Open Wound Of The Right Upper Arm      Esophageal Reflux      Helicobacter Pylori (H. Pylori) Infection      Atrial Fibrillation      Mitral Regurgitation      Lumbar Spondylosis (L4 - L5)      Spinal Stenosis      Past Medical History:   Diagnosis Date     Atrial fibrillation      Past Surgical History:   Procedure Laterality Date     [place] APPENDECTOMY      Description: Appendectomy;  Recorded: 04/27/2007;     [place] OPEN TREATMENT GREATER TROCHANTERIC FRACTURE      Description: Open Treatment Of A Fracture Of The Greater Trochanter;  Proc Date: 01/01/2014;     [place] REMOVAL GALLBLADDER      Description: Cholecystectomy;  Recorded: 01/12/2009;     Social History     Social History     Marital status: Single     Spouse name: N/A     Number of children: N/A     Years of education: N/A     Occupational History     Not on file.     Social History Main Topics     Smoking status: Former Smoker     Smokeless tobacco: Never Used     Alcohol use Not on file     Drug use: Not on file     Sexual activity: Not on file     Other Topics  Concern     Not on file     Social History Narrative       Meds:    Current Outpatient Prescriptions:      calcium carbonate-vitamin D3 (CALTRATE 600 PLUS D3) 600 mg(1,500mg) -400 unit per tablet, TAKE 1 TABLET BY MOUTH TWO TIMES DAILY , Disp: 180 tablet, Rfl: 3     escitalopram oxalate (LEXAPRO) 10 MG tablet, TAKE 1 TABLET BY MOUTH DAILY, Disp: 90 tablet, Rfl: 2     furosemide (LASIX) 40 MG tablet, TAKE 1 TABLET BY MOUTH 2 TIMES A DAY, Disp: 180 tablet, Rfl: 2     metoprolol succinate (TOPROL-XL) 50 MG 24 hr tablet, TAKE 1 TABLET BY MOUTH DAILY, Disp: 90 tablet, Rfl: 2     NIFEdipine (PROCARDIA XL) 30 MG 24 hr tablet, TAKE 1 TABLET BY MOUTH AT BEDTIME, Disp: 90 tablet, Rfl: 3     omeprazole (PRILOSEC) 20 MG capsule, TAKE 1 CAPSULE (20 MG) BY MOUTH ONCE DAILY, Disp: 90 capsule, Rfl: 2     potassium chloride (MICRO-K) 10 mEq CR capsule, Take 1 capsule (10 mEq total) by mouth daily., Disp: 30 capsule, Rfl: 3     simvastatin (ZOCOR) 40 MG tablet, TAKE 1 TABLET BY MOUTH AT BEDTIME, Disp: 90 tablet, Rfl: 2     warfarin (COUMADIN) 2.5 MG tablet, TAKE 1 TO 2 TABLETS BY MOUTH DAILY AS DIRECTED PER INR CHECKS., Disp: 180 tablet, Rfl: 0     ranitidine (ZANTAC) 150 MG tablet, Take 1 tablet (150 mg total) by mouth 2 (two) times a day., Disp: 180 tablet, Rfl: 3    Current Facility-Administered Medications:      pneumococcal conj. 13-valent vaccine 0.5 mL (PREVNAR-13), 0.5 mL, Intramuscular, Once, Kathy Vyas MD    Allergies:  Allergies   Allergen Reactions     Amlodipine Besylate      Annotation: Pt denies being allergic to any medications.         ROS:  Pertinent positives as noted in HPI; otherwise 12 point ROS negative.      Physical Exam:  EXAM:  /56 (Patient Site: Left Arm, Patient Position: Sitting, Cuff Size: Adult Regular)  Pulse (!) 59  Temp 98  F (36.7  C) (Oral)   Resp 14  Wt 133 lb 4.8 oz (60.5 kg)  SpO2 99%  BMI 23.61 kg/m2   Gen:  NAD, appears well, well-hydrated, hard of hearing;    HEENT:  TMs nl, oropharynx benign, nasal mucosa nl, conjunctiva clear  Neck:  Supple, no adenopathy, no thyromegaly, no carotid bruits, no JVD  Lungs:  Clear to auscultation bilaterally  Cor:  RRR no murmur  Abd:  Soft, nontender, BS+, no masses, no guarding or rebound, no HSM  Extr:  2+ edema lower extremity; mild lower extremity muscle atrophy  Neuro:  No asymmetry, ataxic gait  Skin:  Warm/dry        Results:  Results for orders placed or performed in visit on 11/13/17   Comprehensive Metabolic Panel   Result Value Ref Range    Sodium 144 136 - 145 mmol/L    Potassium 4.3 3.5 - 5.0 mmol/L    Chloride 104 98 - 107 mmol/L    CO2 31 22 - 31 mmol/L    Anion Gap, Calculation 9 5 - 18 mmol/L    Glucose 125 70 - 125 mg/dL    BUN 27 8 - 28 mg/dL    Creatinine 1.35 (H) 0.60 - 1.10 mg/dL    GFR MDRD Af Amer 44 (L) >60 mL/min/1.73m2    GFR MDRD Non Af Amer 36 (L) >60 mL/min/1.73m2    Bilirubin, Total 0.8 0.0 - 1.0 mg/dL    Calcium 9.3 8.5 - 10.5 mg/dL    Protein, Total 6.3 6.0 - 8.0 g/dL    Albumin 3.2 (L) 3.5 - 5.0 g/dL    Alkaline Phosphatase 76 45 - 120 U/L    AST 17 0 - 40 U/L    ALT 11 0 - 45 U/L   Thyroid Stimulating Hormone (TSH)   Result Value Ref Range    TSH 1.54 0.30 - 5.00 uIU/mL   HM1 (CBC with Diff)   Result Value Ref Range    WBC 6.0 4.0 - 11.0 thou/uL    RBC 3.89 3.80 - 5.40 mill/uL    Hemoglobin 10.6 (L) 12.0 - 16.0 g/dL    Hematocrit 33.7 (L) 35.0 - 47.0 %    MCV 86 80 - 100 fL    MCH 27.3 27.0 - 34.0 pg    MCHC 31.5 (L) 32.0 - 36.0 g/dL    RDW 12.0 11.0 - 14.5 %    Platelets 243 140 - 440 thou/uL    MPV 7.0 7.0 - 10.0 fL    Neutrophils % 72 (H) 50 - 70 %    Lymphocytes % 17 (L) 20 - 40 %    Monocytes % 9 2 - 10 %    Eosinophils % 2 0 - 6 %    Basophils % 0 0 - 2 %    Neutrophils Absolute 4.3 2.0 - 7.7 thou/uL    Lymphocytes Absolute 1.0 0.8 - 4.4 thou/uL    Monocytes Absolute 0.5 0.0 - 0.9 thou/uL    Eosinophils Absolute 0.1 0.0 - 0.4 thou/uL    Basophils Absolute 0.0 0.0 - 0.2 thou/uL

## 2021-06-14 NOTE — PROGRESS NOTES
ASSESSMENT/PLAN:  1. Pneumonia  azithromycin (ZITHROMAX) 250 MG tablet   2. Atrial fibrillation  INR       This is a 89 yo female - high risk for illness/worsening pulmonary symptoms; now with cough, congestion, worsening despite conservative treatment.  Daughter (lives in same home) is also sick with same symptoms.  Will treat with Azithromycin for presumptive pneumonia.  If symptoms worsen, will NTBS.  Discussed signs/symptoms that should prompt more urgent evaluation.    Patient with persistent atrial fib - will check INR.  Will notify anticoagulation team that she is on Azithromycin x 5 days.         There are no discontinued medications.  There are no Patient Instructions on file for this visit.    Chief Complaint:  Chief Complaint   Patient presents with     INR Check     Cough     Nasal Congestion       HPI:   Fang Villasenor is a 89 y.o. female c/o  Patient and daughter are both sick  Seen about 10 days ago - started Amoxicillin last Tuesday  No fever, no chills  Both got sick about the same time        PMH:   Patient Active Problem List    Diagnosis Date Noted     Diarrhea 11/13/2017     Hearing loss 11/08/2017     Hallucinations, visual 08/30/2017     Chronic anticoagulation 05/29/2017     Situational depression 07/01/2016     Decubitus ulcer, hip, left, stage I 02/14/2016     Unintentional weight loss 12/04/2015     Allergic rhinitis 05/18/2015     Seasonal allergies 05/11/2015     Closed Subtrochanteric Fracture Of The Left Femur      Onychomycosis Of The Toenails      Edema      Iron Deficiency      Anemia      Fibroadenoma Of The Breast      Cataract      Urinary Tract Infection      Atypical Chest Pain      Menopausal Disorder      Vitamin B12 Deficiency      Vitamin D Deficiency      Mixed hyperlipidemia      Hypokalemia      Hypertension      Chronic Kidney Disease, Stage 3      Osteoporosis      Open Wound Of The Forearm      A Fall      Open Wound Of The Right Upper Arm      Esophageal Reflux       Helicobacter Pylori (H. Pylori) Infection      Atrial Fibrillation      Mitral Regurgitation      Lumbar Spondylosis (L4 - L5)      Spinal Stenosis      Past Medical History:   Diagnosis Date     Atrial fibrillation      Past Surgical History:   Procedure Laterality Date     [place] APPENDECTOMY      Description: Appendectomy;  Recorded: 04/27/2007;     [place] OPEN TREATMENT GREATER TROCHANTERIC FRACTURE      Description: Open Treatment Of A Fracture Of The Greater Trochanter;  Proc Date: 01/01/2014;     [place] REMOVAL GALLBLADDER      Description: Cholecystectomy;  Recorded: 01/12/2009;     Social History     Social History     Marital status: Single     Spouse name: N/A     Number of children: N/A     Years of education: N/A     Occupational History     Not on file.     Social History Main Topics     Smoking status: Former Smoker     Smokeless tobacco: Never Used     Alcohol use Not on file     Drug use: Not on file     Sexual activity: Not on file     Other Topics Concern     Not on file     Social History Narrative       Meds:    Current Outpatient Prescriptions:      calcium carbonate-vitamin D3 (CALTRATE 600 PLUS D3) 600 mg(1,500mg) -400 unit per tablet, TAKE 1 TABLET BY MOUTH TWO TIMES DAILY , Disp: 180 tablet, Rfl: 3     escitalopram oxalate (LEXAPRO) 10 MG tablet, TAKE 1 TABLET BY MOUTH DAILY, Disp: 90 tablet, Rfl: 2     furosemide (LASIX) 40 MG tablet, TAKE 1 TABLET BY MOUTH 2 TIMES A DAY, Disp: 180 tablet, Rfl: 2     metoprolol succinate (TOPROL-XL) 50 MG 24 hr tablet, TAKE 1 TABLET BY MOUTH DAILY, Disp: 90 tablet, Rfl: 2     NIFEdipine (PROCARDIA XL) 30 MG 24 hr tablet, TAKE 1 TABLET BY MOUTH AT BEDTIME, Disp: 90 tablet, Rfl: 3     omeprazole (PRILOSEC) 20 MG capsule, TAKE 1 CAPSULE (20 MG) BY MOUTH ONCE DAILY, Disp: 90 capsule, Rfl: 2     potassium chloride (MICRO-K) 10 mEq CR capsule, Take 1 capsule (10 mEq total) by mouth daily., Disp: 30 capsule, Rfl: 3     simvastatin (ZOCOR) 40 MG tablet, TAKE 1 TABLET BY  MOUTH AT BEDTIME, Disp: 90 tablet, Rfl: 2     warfarin (COUMADIN) 2.5 MG tablet, Take 1/2 to 1 tablet daily as directed, Disp: 180 tablet, Rfl: 0     azithromycin (ZITHROMAX) 250 MG tablet, Take 500 mg (2 x 250 mg tablets) on day 1 followed by 250 mg (1 tablet) on days 2-5., Disp: 6 tablet, Rfl: 0     ranitidine (ZANTAC) 150 MG tablet, Take 1 tablet (150 mg total) by mouth 2 (two) times a day., Disp: 180 tablet, Rfl: 3    Current Facility-Administered Medications:      pneumococcal conj. 13-valent vaccine 0.5 mL (PREVNAR-13), 0.5 mL, Intramuscular, Once, Kathy Vyas MD    Allergies:  Allergies   Allergen Reactions     Amlodipine Besylate      Annotation: Pt denies being allergic to any medications.         ROS:  Pertinent positives as noted in HPI; otherwise 12 point ROS negative.      Physical Exam:  EXAM:  /56 (Patient Site: Left Arm, Patient Position: Sitting, Cuff Size: Adult Small)  Pulse 63  Temp 98.1  F (36.7  C) (Oral)   Resp 14  Wt 123 lb 9.6 oz (56.1 kg)  SpO2 99%  BMI 21.89 kg/m2   Gen:  NAD, appears well, well-hydrated  HEENT:  TMs nl, oropharynx benign, nasal mucosa congested and swollen,  conjunctiva clear  Neck:  Supple, no adenopathy, no thyromegaly, no carotid bruits, no JVD  Lungs:  Clear to auscultation bilaterally, occ coarse rhonchi  Cor:  RRR no murmur  Abd:  Soft, nontender, BS+, no masses, no guarding or rebound, no HSM  Extr:  Neg.  Neuro:  No asymmetry  Skin:  Warm/dry        Results:  Results for orders placed or performed in visit on 12/11/17   INR   Result Value Ref Range    INR 4.20 (H) 0.90 - 1.10

## 2021-06-14 NOTE — PROGRESS NOTES
Subjective:      Patient ID: Fang Villasenor is a 89 y.o. female.    Chief Complaint:    Cough   This is a new (NOTED ABOUT 3 DAYS AGO.Sound hacky and dry.Has some nasal drainage .) problem. The current episode started in the past 7 days. The problem occurs constantly. The problem has been gradually worsening (appears to be worsening.). Associated symptoms include congestion and coughing. Pertinent negatives include no abdominal pain, anorexia, chest pain, chills, fatigue, fever, headaches, nausea, sore throat or vomiting. The symptoms are aggravated by coughing. Treatments tried: OTC cough medications. The treatment provided no relief.        The following portions of the patient's history were reviewed and updated as appropriate: allergies, current medications, past family history, past medical history, past social history, past surgical history and problem list.       Past Medical History:   Diagnosis Date     Atrial fibrillation        Past Surgical History:   Procedure Laterality Date     [place] APPENDECTOMY      Description: Appendectomy;  Recorded: 04/27/2007;     [place] OPEN TREATMENT GREATER TROCHANTERIC FRACTURE      Description: Open Treatment Of A Fracture Of The Greater Trochanter;  Proc Date: 01/01/2014;     [place] REMOVAL GALLBLADDER      Description: Cholecystectomy;  Recorded: 01/12/2009;       No family history on file.    Social History   Substance Use Topics     Smoking status: Former Smoker     Smokeless tobacco: Never Used     Alcohol use None       Review of Systems   Unable to perform ROS: Patient nonverbal   Constitutional: Negative for chills, fatigue and fever.   HENT: Positive for congestion and rhinorrhea. Negative for sore throat.    Respiratory: Positive for cough. Negative for shortness of breath.    Cardiovascular: Negative for chest pain.   Gastrointestinal: Negative for abdominal pain, anorexia, nausea and vomiting.   Neurological: Negative for headaches.       Objective:     BP 98/52  Pulse  67  Temp 98.3  F (36.8  C) (Oral)   Resp 20  Wt 133 lb (60.3 kg)  SpO2 97%  Breastfeeding? No  BMI 23.56 kg/m2    Physical Exam   Constitutional: She appears well-developed and well-nourished.   Quiet not very verbal,Daughter taking for her but she obey instructions which she seems to understand.  Afebrile to touch,in no respiratory distress.   HENT:   Head: Normocephalic and atraumatic.   Right Ear: Tympanic membrane normal.   Left Ear: Tympanic membrane normal.   Nose: Rhinorrhea present. Right sinus exhibits no maxillary sinus tenderness and no frontal sinus tenderness. Left sinus exhibits no maxillary sinus tenderness and no frontal sinus tenderness.   Mouth/Throat: No oropharyngeal exudate, posterior oropharyngeal edema or posterior oropharyngeal erythema.   Whits substance noted on the oropharynx which the daughter said is possibly medication tablets she took I have been unable to reach this patient by phone.  A letter is being sent. To swallow and does not drink water.   Neck: Normal range of motion.   Cardiovascular: Normal rate and regular rhythm.    Pulmonary/Chest: Effort normal.   Lymphadenopathy:     She has no cervical adenopathy.   Neurological: She is alert.       Assessment:     Procedures    1. Cough  - amoxicillin (AMOXIL) 500 MG capsule; Take 1 capsule (500 mg total) by mouth 3 (three) times a day for 10 days.  Dispense: 30 capsule; Refill: 0      Plan:   Discussed with the daughter symptom control ,use cough medication and encouragement to increase water intake.Also advised to try using nasal saline spray. If there is no improvement with that,then they can fill the antibiotics. Follow up with PCP if not better.

## 2021-06-15 PROBLEM — Z79.01 CHRONIC ANTICOAGULATION: Status: ACTIVE | Noted: 2017-05-29

## 2021-06-15 NOTE — PROGRESS NOTES
[facility]      Facility:    [facility] [[id number]]  Code Status: FULL CODE      Chief Complaint/Reason for Visit:  Chief Complaint   Patient presents with     H & P       HPI:   Fang is a 89 y.o. female who has had a progression of her dementia fairly rapidly such that her daughter is unable to care for her at home.  Fang is experiencing visual and auditory hallucinations which she is interacting with.  She is taking Lexapro for situational depression.  Her daughter mentions that she was unaware that is simple things like her urinary tract infection can tip her mother into worse dementia symptoms rather rapidly.  Apparently that is what was determined that the hospital this last time, and I confirmed that that is the nature of her mother's current health which is quite frail in regards to the dementia issue.    Upon other review of systems currently, she is not experiencing fevers or chills, she does not have nasal congestion or sore throat, she does not have cough or increased shortness of breath nor chest pain.  She is not experiencing abdominal pain or nausea or bowel troubles.  She no longer has any dysuria.    Past Medical History:  Past Medical History:   Diagnosis Date     A-fib      Atrial fibrillation      CKD (chronic kidney disease)      Closed head injury      Hallucinations, visual      HTN (hypertension)      Hypokalemia      Osteoporosis            Surgical History:  Past Surgical History:   Procedure Laterality Date     [place] APPENDECTOMY      Description: Appendectomy;  Recorded: 04/27/2007;     [place] OPEN TREATMENT GREATER TROCHANTERIC FRACTURE      Description: Open Treatment Of A Fracture Of The Greater Trochanter;  Proc Date: 01/01/2014;     [place] REMOVAL GALLBLADDER      Description: Cholecystectomy;  Recorded: 01/12/2009;       Family History: Family history is not pertinent to chief complaint or presenting problems    Social History:    Social History  "    Social History     Marital status: Single     Spouse name: N/A     Number of children: N/A     Years of education: N/A     Social History Main Topics     Smoking status: Former Smoker     Smokeless tobacco: Never Used     Alcohol use No     Drug use: No     Sexual activity: Not on file     Other Topics Concern     Not on file     Social History Narrative          Review of Systems   All other systems reviewed and are negative.      Vitals:    02/05/18 0657   BP: 109/44   Pulse: 80   Resp: 18   Temp: 98.2  F (36.8  C)   SpO2: 97%   Weight: 124 lb 4.8 oz (56.4 kg)   Height: 5' 2\" (1.575 m)       Physical Exam   Constitutional: No distress.   HENT:   Mouth/Throat: Oropharynx is clear and moist.   Eyes: Right eye exhibits no discharge. Left eye exhibits no discharge.   Neck: No thyromegaly present.   Cardiovascular: Normal rate.    Irregular rhythm   Pulmonary/Chest: Effort normal and breath sounds normal.   Abdominal: Soft. Bowel sounds are normal. She exhibits no distension. There is no tenderness.   Musculoskeletal: She exhibits no edema.   Lymphadenopathy:     She has no cervical adenopathy.   Neurological:   She is alert and cooperative with simple commands to the best of her ability but is not able to perform the finger to nose test.  Mini cog test is strikingly abnormal with 0 of 3 for word recall and a bizarre clock face with numbers only on one side and in a random order and hands on the clock which are also off to one side and have no bearing to any orientation.   Skin: Skin is warm and dry. No rash noted. No erythema.   Psychiatric: She has a normal mood and affect. Her behavior is normal.   Nursing note and vitals reviewed.      Medication List:  Current Outpatient Prescriptions   Medication Sig     calcium carbonate-vitamin D3 (CALCIUM 600 + D,3,) 600 mg(1,500mg) -400 unit per tablet Take 1 tablet by mouth 2 (two) times a day. Takes morning and 4 pm     escitalopram oxalate (LEXAPRO) 10 MG tablet Take 1 " tablet (10 mg total) by mouth daily.     furosemide (LASIX) 40 MG tablet Take 40 mg by mouth 2 (two) times a day. Take at 9 am and 4 pm     metoprolol succinate (TOPROL-XL) 25 MG Take 25 mg by mouth daily.     NIFEdipine (PROCARDIA XL) 30 MG 24 hr tablet Take 30 mg by mouth at bedtime.     omeprazole (PRILOSEC) 20 MG capsule Take 20 mg by mouth daily.      potassium chloride (MICRO-K) 10 mEq CR capsule Take 1 capsule (10 mEq total) by mouth daily.     simvastatin (ZOCOR) 40 MG tablet Take 40 mg by mouth at bedtime.       Labs:  No new laboratory testing    Assessment:    ICD-10-CM    1. Late onset Alzheimer's disease with behavioral disturbance G30.1     F02.81    2. Hypertension I10    3. Situational depression F43.21    4. Chronic atrial fibrillation I48.2    5. Hallucinations, visual R44.1        Plan:  I explained that placement in long-term care facility is the appropriate thing to do at this time.  I also explained that the Alzheimer's disease is severe and that standard medications such as Aricept would not be of benefit.  I agree that the lateral stability because depression itself can cause more dementia symptoms as well.  I agree that it is not safe to be on anticoagulant medication for the atrial fibrillation.  This is in terms of weighing the risks versus benefits.      Electronically signed by: Mak Pedraza MD

## 2021-06-15 NOTE — PROGRESS NOTES
"ASSESSMENT/PLAN:  1. Chronic Kidney Disease, Stage 3  HM1(CBC and Differential)    Comprehensive Metabolic Panel    Phosphorus    Magnesium    HM1 (CBC with Diff)   2. Atrial fibrillation  INR       This is a 89 yo female with multiple chronic underlying medical conditions.  She is here for hospital follow up - apparently had another recent fall at home - was admitted to observation after that.  Was discharged home for \"24/7\" care.  Her daughter expressed great frustration about this - feels that she needs to be awake all the time in case her mom falls.  Is expressing caregiver burnout  When I walked in to the room today, the patient and daughter were shouting at each other.  I intervened and we had a healthy discussion about all of this.  We took time to review recent events. We have also reviewed more remote history, including the fact that patient and daughter have lived together x decades.  Daughter is frustrated by her inability to go anywhere because she has to be home with her mom.  Patient claims that she is \"nice\" and doesn't try to cause problems.  Daughter is most frustrated by now trying to provide true 24/7 care - she has been staying awake day and night so she can watch mom.  I was able to get our  involved today - we have discussed resources available - immediate and future.  We have discussed that this is a situation that could become more distressing for both patient and daughter.  IF there is any challenge, it would be reasonable to seek care in the ER for placement.  I would prefer to manage this more thoughtfully with our , but I don't want to cause danger for either patient/daughter.  They both seem to understand.    We have reviewed recent hospitalization, workup, notes, imaging, labs/results.  There are recommendations for follow up labs - these are ordered - will check labs today    Total of 45 minutes spent with patient and daughter today - >50% in counseling time/ " "review of care options for patient.  Would encourage in the short term that they check into adult \"day care\" programs.          Medications Discontinued During This Encounter   Medication Reason     escitalopram oxalate (LEXAPRO) 10 MG tablet Reorder     There are no Patient Instructions on file for this visit.    Chief Complaint:  Chief Complaint   Patient presents with     Follow Up     closed head injury, inpt [place]       HPI:   Fang Villasenor is a 89 y.o. female c/o  Has been living in daughter's basement -   Left hospital on Sunday - was told she needs 24 hour care -   Daughter is stressed  Has been falling at home  Usually uses walker at home, and stair lift but recently has been trying to do the stairs on own      PMH:   Patient Active Problem List    Diagnosis Date Noted     Acute cystitis without hematuria 02/03/2018     Dementia 02/02/2018     Closed head injury 01/26/2018     Diarrhea 11/13/2017     Hearing loss 11/08/2017     Hallucinations, visual 08/30/2017     Chronic anticoagulation 05/29/2017     Situational depression 07/01/2016     Decubitus ulcer, hip, left, stage I 02/14/2016     Unintentional weight loss 12/04/2015     Allergic rhinitis 05/18/2015     Seasonal allergies 05/11/2015     Closed Subtrochanteric Fracture Of The Left Femur      Onychomycosis Of The Toenails      Edema      Iron Deficiency      Anemia      Fibroadenoma Of The Breast      Cataract      Urinary Tract Infection      Atypical Chest Pain      Menopausal Disorder      Vitamin B12 Deficiency      Vitamin D Deficiency      Mixed hyperlipidemia      Hypokalemia      Hypertension      Chronic Kidney Disease, Stage 3      Osteoporosis      Open Wound Of The Forearm      A Fall      Open Wound Of The Right Upper Arm      Esophageal Reflux      Helicobacter Pylori (H. Pylori) Infection      Atrial fibrillation      Mitral Regurgitation      Lumbar Spondylosis (L4 - L5)      Spinal Stenosis      Past Medical History: "   Diagnosis Date     A-fib      Atrial fibrillation      CKD (chronic kidney disease)      Closed head injury      Hallucinations, visual      HTN (hypertension)      Hypokalemia      Osteoporosis      Past Surgical History:   Procedure Laterality Date     SD APPENDECTOMY      Description: Appendectomy;  Recorded: 04/27/2007;     SD OPEN TREATMENT GREATER TROCHANTERIC FRACTURE      Description: Open Treatment Of A Fracture Of The Greater Trochanter;  Proc Date: 01/01/2014;     SD REMOVAL GALLBLADDER      Description: Cholecystectomy;  Recorded: 01/12/2009;     Social History     Social History     Marital status: Single     Spouse name: N/A     Number of children: N/A     Years of education: N/A     Occupational History     Not on file.     Social History Main Topics     Smoking status: Former Smoker     Smokeless tobacco: Never Used     Alcohol use No     Drug use: No     Sexual activity: Not on file     Other Topics Concern     Not on file     Social History Narrative       Meds:    Current Outpatient Prescriptions:      calcium carbonate-vitamin D3 (CALCIUM 600 + D,3,) 600 mg(1,500mg) -400 unit per tablet, Take 1 tablet by mouth 2 (two) times a day. Takes morning and 4 pm, Disp: , Rfl:      escitalopram oxalate (LEXAPRO) 10 MG tablet, Take 1 tablet (10 mg total) by mouth daily., Disp: 90 tablet, Rfl: 3     furosemide (LASIX) 40 MG tablet, Take 40 mg by mouth 2 (two) times a day. Take at 9 am and 4 pm, Disp: , Rfl:      NIFEdipine (PROCARDIA XL) 30 MG 24 hr tablet, Take 30 mg by mouth at bedtime., Disp: , Rfl:      omeprazole (PRILOSEC) 20 MG capsule, Take 20 mg by mouth daily. , Disp: , Rfl:      potassium chloride (MICRO-K) 10 mEq CR capsule, Take 1 capsule (10 mEq total) by mouth daily., Disp: 90 capsule, Rfl: 3     simvastatin (ZOCOR) 40 MG tablet, Take 40 mg by mouth at bedtime., Disp: , Rfl:      metoprolol succinate (TOPROL-XL) 25 MG, Take 25 mg by mouth daily., Disp: , Rfl:      QUEtiapine (SEROQUEL) 25 MG  tablet, Take 25 mg by mouth 2 (two) times a day. And see 25mg qd prn also, Disp: , Rfl:      QUEtiapine (SEROQUEL) 25 MG tablet, Take 12.5 mg by mouth daily as needed (Dementia with agitated behaviors)., Disp: , Rfl:     Allergies:  Allergies   Allergen Reactions     Amlodipine Besylate      Annotation: Pt denies being allergic to any medications.         ROS:  Pertinent positives as noted in HPI; otherwise 12 point ROS negative.      Physical Exam:  EXAM:  /50 (Patient Site: Left Arm, Patient Position: Sitting, Cuff Size: Adult Regular)  Pulse 79  Temp 97.5  F (36.4  C) (Oral)   Resp 14  Wt 128 lb (58.1 kg)  SpO2 99%  BMI 23.41 kg/m2   Gen:  NAD, appears well, well-hydrated, confused but defiant, vague historian  HEENT:  TMs nl, oropharynx benign, nasal mucosa nl, conjunctiva clear  Neck:  Supple, no adenopathy, no thyromegaly, no carotid bruits, no JVD  Lungs:  Clear to auscultation bilaterally  Cor:  Irreg,  no murmur  Abd:  Soft, nontender, BS+, no masses, no guarding or rebound, no HSM  Extr:  Neg.  Neuro:  No asymmetry  Skin:  Warm/dry        Results:  Results for orders placed or performed in visit on 01/30/18   Comprehensive Metabolic Panel   Result Value Ref Range    Sodium 144 136 - 145 mmol/L    Potassium 3.9 3.5 - 5.0 mmol/L    Chloride 101 98 - 107 mmol/L    CO2 29 22 - 31 mmol/L    Anion Gap, Calculation 14 5 - 18 mmol/L    Glucose 96 70 - 125 mg/dL    BUN 36 (H) 8 - 28 mg/dL    Creatinine 1.26 (H) 0.60 - 1.10 mg/dL    GFR MDRD Af Amer 48 (L) >60 mL/min/1.73m2    GFR MDRD Non Af Amer 39 (L) >60 mL/min/1.73m2    Bilirubin, Total 0.6 0.0 - 1.0 mg/dL    Calcium 9.1 8.5 - 10.5 mg/dL    Protein, Total 6.8 6.0 - 8.0 g/dL    Albumin 3.4 (L) 3.5 - 5.0 g/dL    Alkaline Phosphatase 82 45 - 120 U/L    AST 20 0 - 40 U/L    ALT 15 0 - 45 U/L   Phosphorus   Result Value Ref Range    Phosphorus 3.5 2.5 - 4.5 mg/dL   Magnesium   Result Value Ref Range    Magnesium 2.2 1.8 - 2.6 mg/dL   HM1 (CBC with Diff)    Result Value Ref Range    WBC 5.5 4.0 - 11.0 thou/uL    RBC 4.07 3.80 - 5.40 mill/uL    Hemoglobin 11.3 (L) 12.0 - 16.0 g/dL    Hematocrit 35.1 35.0 - 47.0 %    MCV 86 80 - 100 fL    MCH 27.7 27.0 - 34.0 pg    MCHC 32.1 32.0 - 36.0 g/dL    RDW 13.6 11.0 - 14.5 %    Platelets 276 140 - 440 thou/uL    MPV 7.3 7.0 - 10.0 fL    Neutrophils % 70 50 - 70 %    Lymphocytes % 18 (L) 20 - 40 %    Monocytes % 10 2 - 10 %    Eosinophils % 2 0 - 6 %    Basophils % 0 0 - 2 %    Neutrophils Absolute 3.9 2.0 - 7.7 thou/uL    Lymphocytes Absolute 1.0 0.8 - 4.4 thou/uL    Monocytes Absolute 0.5 0.0 - 0.9 thou/uL    Eosinophils Absolute 0.1 0.0 - 0.4 thou/uL    Basophils Absolute 0.0 0.0 - 0.2 thou/uL   INR   Result Value Ref Range    INR 1.70 (H) 0.90 - 1.10

## 2021-06-15 NOTE — PROGRESS NOTES
[facility]oy [facility] came to get SW as Fang and her daughter Natalie were yelling at each other in exam room 22. She had been hospitalized for a fall and was discharged home with home care follow up. SW entered the room, doctor was present and both mother and daughter were in tears. Daughter Natalie appears to have severe care giver fatigue and reported always being the one that mom lived with and they own a home together. There is one other sister in [place].    Fang has [facility] for insurance and they do require a prior authorization for PCA services. CHIRSTO filled out that form and faxed to [facility] today with the Fang and Natalie's permission. CHRISTO also gave Natalie some information on the [facility]. That would give Fang some socialization outlets and Natalie some care giver relief. Natalie reported spending the morning up until 3 pm with Fang in her living area and then coming upstairs to take care of her own family. Natalie dies have computer assess at home and CHRISTO let her know that there are caregiver support groups and educational opportunities for care givers.    Natalie reported that a nurse is coming out to the home today. Natalie seems concerned that even though Fang has always used the chair lift to get up and down the stairs, more and more frequently Natalie has caught her on the stairs and not using the lift. Fang was surprised by that, and said she did not realize it happens as often as that. Natalie reported staying up the last three nights because Fang is waking up and wandering. Natalie reported that the sleeping pill appears not to be working.

## 2021-06-16 PROBLEM — E87.5 HYPERKALEMIA: Status: ACTIVE | Noted: 2018-01-01

## 2021-06-16 PROBLEM — R19.7 DIARRHEA: Status: ACTIVE | Noted: 2017-01-01

## 2021-06-16 PROBLEM — Z51.5 ENCOUNTER FOR HOSPICE CARE: Status: ACTIVE | Noted: 2018-01-01

## 2021-06-16 PROBLEM — F02.818 LATE ONSET ALZHEIMER'S DISEASE WITH BEHAVIORAL DISTURBANCE (H): Status: ACTIVE | Noted: 2018-01-01

## 2021-06-16 PROBLEM — S09.90XA CLOSED HEAD INJURY: Status: ACTIVE | Noted: 2018-01-01

## 2021-06-16 PROBLEM — F03.90 DEMENTIA (H): Status: ACTIVE | Noted: 2018-01-01

## 2021-06-16 PROBLEM — G30.1 LATE ONSET ALZHEIMER'S DISEASE WITH BEHAVIORAL DISTURBANCE (H): Status: ACTIVE | Noted: 2018-01-01

## 2021-06-16 PROBLEM — H91.90 HEARING LOSS: Status: ACTIVE | Noted: 2017-01-01

## 2021-06-16 PROBLEM — R44.1 HALLUCINATIONS, VISUAL: Status: ACTIVE | Noted: 2017-01-01

## 2021-06-16 PROBLEM — Z51.5 ADMISSION FOR HOSPICE CARE: Status: ACTIVE | Noted: 2018-01-01

## 2021-06-16 PROBLEM — N30.00 ACUTE CYSTITIS WITHOUT HEMATURIA: Status: ACTIVE | Noted: 2018-01-01

## 2021-06-16 NOTE — PROGRESS NOTES
[facility]    [id number]   Code Status: FULL CODE      CHIEF COMPLAINT/REASON FOR VISIT:  Chief Complaint   Patient presents with     Problem Visit     cough       HISTORY:      HPI: Fang is a 89 y.o. female whom I was asked to see by her daughter as I was walking in the hallway of the long-term care facility and ran into Fang and her daughter.  Her concern was about persistent cough.  She has had recent fever and has been on antibiotics for suspected lower respiratory infection, and she has had recurrent urinary tract infections in the recent past.  There is not a productive cough and she is not feeling short of breath and she is not having chest pain.  Also no fevers or chills.  No dysuria.  Her daughter still hopes to take her home with increased home services because of her underlying dementia with behavioral outbursts as well as underlying depression since she does seem to do best in the presence of her family.    Past Medical History:   Diagnosis Date     A-fib      Atrial fibrillation      CKD (chronic kidney disease)      Closed head injury      Hallucinations, visual      HTN (hypertension)      Hypokalemia      Osteoporosis              No family history on file.  Social History     Social History     Marital status: Single     Spouse name: N/A     Number of children: N/A     Years of education: N/A     Social History Main Topics     Smoking status: Former Smoker     Smokeless tobacco: Never Used     Alcohol use No     Drug use: No     Sexual activity: Not on file     Other Topics Concern     Not on file     Social History Narrative         Review of Systems    .  Vitals:    02/25/18 0811   Temp: 97.5  F (36.4  C)       Physical Exam   Constitutional: No distress.   Cardiovascular: Normal rate.    Irregular rhythm   Pulmonary/Chest: Effort normal.   Upper airway sounds heard but no rales   Neurological: She is alert.   Psychiatric: She  has a normal mood and affect.   Nursing note and vitals reviewed.        LABS:   No new laboratory testing    ASSESSMENT:      ICD-10-CM    1. Cough R05    2. Late onset Alzheimer's disease with behavioral disturbance G30.1     F02.81    3. Situational depression F43.21        PLAN:    I explained that the course of even pneumonia is a long course and that antibiotics are used only in the early phase to get body over the worst of the infection and then it depends on the body's defenses to complete that clearing of the infection from the body.  There is not need for further testing unless she has a sudden worsening of symptoms with new fever or worsened cough or chest pain or shortness of breath.      Electronically signed by: Mak Pedraza MD

## 2021-06-16 NOTE — PROGRESS NOTES
[facility]    [facility][id number][facility]   Code Status: FULL CODE  PCP: Nick Hanson CNP   Phone: [phone]   Fax: [phone]      CHIEF COMPLAINT/REASON FOR VISIT:  Chief Complaint   Patient presents with     Discharge Summary       HISTORY COURSE:  Fang is a 89 y.o. female who has had a progression of her dementia fairly rapidly such that her daughter is unable to care for her at home.  Fang is experiencing visual and auditory hallucinations with which she is interacting.  She is taking Lexapro for situational depression.  Her daughter mentions that she was unaware that  simple things like her urinary tract infection can tip her mother into worse dementia symptoms rather rapidly.  Apparently that is what was determined at the hospital this last time, and I confirmed that that is the nature of her mother's current health which is quite frail in regards to the dementia issue.  It is appropriate that she is not taking anticoagulant for her atrial fibrillation since she is a high risk for fall.    While she has been at long-term care, she has had episodic outbursts of abnormal behavior resulting in a trip to the emergency room when it was uncontrollable by the staff that the long-term care facility.  The presence of family helps to calm her more than any other factor.  Her daughter has decided to take her home with additional cares.    Upon current review of systems, she has just started to have diarrhea.  This is not watery diarrhea but it is loose stool and frequent.  Imodium 2 mg one 4 times daily as needed diarrhea was started today.  She has not been having fevers or chills nor has she been having nasal congestion or sore throat nor cough nor shortness of breath nor chest pain nor abdominal pain or nausea.  No dysuria.    Review of Systems    Vitals:    02/19/18 0758   BP: 105/76   Pulse: 72   Resp: 18   Temp: 97.5  F (36.4  C)   SpO2: 95%        Physical Exam   Constitutional: No distress.   Eyes: Right eye exhibits no discharge. Left eye exhibits no discharge.   Cardiovascular: Normal rate and normal heart sounds.    Irregular rhythm   Pulmonary/Chest: Breath sounds normal.   Abdominal: Soft. Bowel sounds are normal. She exhibits no distension. There is no tenderness.   Musculoskeletal: She exhibits no edema.   Neurological: She is alert.   Psychiatric: She has a normal mood and affect.   Nursing note and vitals reviewed.      MEDICATION LIST:  Current Outpatient Prescriptions   Medication Sig     loperamide (IMODIUM) 2 mg capsule Take 2 mg by mouth 4 (four) times a day as needed for diarrhea.     albuterol (PROVENTIL) 2.5 mg /3 mL (0.083 %) nebulizer solution Take 2.5 mg by nebulization every 4 (four) hours as needed for wheezing or shortness of breath.     calcium carbonate-vitamin D3 (CALCIUM 600 + D,3,) 600 mg(1,500mg) -400 unit per tablet Take 1 tablet by mouth 2 (two) times a day. Takes morning and 4 pm     escitalopram oxalate (LEXAPRO) 10 MG tablet Take 1 tablet (10 mg total) by mouth daily.     furosemide (LASIX) 40 MG tablet Take 40 mg by mouth 2 (two) times a day. Take at 9 am and 4 pm     metoprolol succinate (TOPROL-XL) 25 MG Take 25 mg by mouth daily.     NIFEdipine (PROCARDIA XL) 30 MG 24 hr tablet Take 30 mg by mouth at bedtime.     omeprazole (PRILOSEC) 20 MG capsule Take 20 mg by mouth daily.      potassium chloride (MICRO-K) 10 mEq CR capsule Take 1 capsule (10 mEq total) by mouth daily.     QUEtiapine (SEROQUEL) 25 MG tablet Take 25 mg by mouth 3 (three) times a day. And see 25mg qd prn also      QUEtiapine (SEROQUEL) 25 MG tablet Take 25 mg by mouth 2 (two) times a day as needed (Dementia with agitated behaviors).      simvastatin (ZOCOR) 40 MG tablet Take 40 mg by mouth at bedtime.       DISCHARGE DIAGNOSIS:    ICD-10-CM    1. Late onset Alzheimer's disease with behavioral disturbance G30.1     F02.81    2. Hallucinations,  visual R44.1    3. Situational depression F43.21    4. Diarrhea, unspecified type R19.7    5. Chronic atrial fibrillation I48.2    6. Hypertension I10        MEDICAL EQUIPMENT NEEDS:  No additional equipment beyond the standard wheelchair    DISCHARGE PLAN/FACE TO FACE:  I certify that services are/were furnished while this patient was under the care of a physician and that a physician or an allowed non-physician practitioner (NPP), had a face-to-face encounter that meets the physician face-to-face encounter requirements. The encounter was in whole, or in part, related to the primary reason for home health. The patient is confined to his/her home and needs intermittent skilled nursing, physical therapy, speech-language pathology, or the continued need for occupational therapy. A plan of care has been established by a physician and is periodically reviewed by a physician.  Date of Face-to-Face Encounter: 2/19/18    I certify that, based on my findings, the following services are medically necessary home health services: Home health aide and RN    My clinical findings support the need for the above skilled services because: (Please write a brief narrative summary that describes what the RN, PT, SLP, or other services will be doing in the home. A list of diagnoses in this section does not meet the CMS requirements.)  Home health aide services are for bathing and ADL needs.  Skilled nursing visits are for medication set up and teaching, symptom and disease process monitoring and education.    This patient is homebound because: (Please write a brief narrative summary describing the functional limitations as to why this patient is homebound and specifically what makes this patient homebound.)  She has physical weakness such that she is primarily in a wheelchair.  Her cognitive disability with dementia and behavior outbursts requires attention at all times    The patient is, or has been, under my care and I have initiated  the establishment of the plan of care. This patient will be followed by a physician who will periodically review the plan of care.    Schedule follow up visit with primary care provider within 7 days to reestablish care.    Electronically signed by: Mak Pedraza MD

## 2021-06-17 NOTE — PROGRESS NOTES
"ASSESSMENT/PLAN:  1. Late onset Alzheimer's disease with behavioral disturbance  Ambulatory referral to Neurology       This is a 89 yo female with dementia.  She had been living with her daughter in daughter's (and son-in-law's) home.  She began experiencing behavior issues - and after ER visits and hospitalization, determination was made that patient would be safer in a LTC environment.  While she was there, her daughter was discontent, feeling that she would like her mother to come back home.  Patient's daughter is confused about diagnoses - can't figure out \"what kind of dementia\" her mother has.  Both patient and daughter become contentious in the room - arguing frequently about minor details.  I think the daughter will benefit from more education and consult.  Agreed to referral to Neurology.      Total time of visit = 34 minutes with >50% spent in counseling time regarding dementia, caregiver burnout.        There are no discontinued medications.  There are no Patient Instructions on file for this visit.    Chief Complaint:  Chief Complaint   Patient presents with     Dementia       HPI:   Fang Villasenor is a 89 y.o. female c/o    Still at home -   Cared for by 2 daughters and granddaughter  She gets aggravated, then daughter gets aggravated;   Natalie's sister told her that dementia may overlap -   She is \"so confused\"  Feels like medication \"isn't working\"         PMH:   Patient Active Problem List    Diagnosis Date Noted     Acute cystitis without hematuria 02/03/2018     Dementia 02/02/2018     Closed head injury 01/26/2018     Diarrhea 11/13/2017     Hearing loss 11/08/2017     Hallucinations, visual 08/30/2017     Chronic anticoagulation 05/29/2017     Situational depression 07/01/2016     Decubitus ulcer, hip, left, stage I 02/14/2016     Unintentional weight loss 12/04/2015     Allergic rhinitis 05/18/2015     Seasonal allergies 05/11/2015     Closed Subtrochanteric Fracture Of The Left Femur      " Onychomycosis Of The Toenails      Edema      Iron Deficiency      Anemia      Fibroadenoma Of The Breast      Cataract      Urinary Tract Infection      Atypical Chest Pain      Menopausal Disorder      Vitamin B12 Deficiency      Vitamin D Deficiency      Mixed hyperlipidemia      Hypokalemia      Hypertension      Chronic Kidney Disease, Stage 3      Osteoporosis      Open Wound Of The Forearm      A Fall      Open Wound Of The Right Upper Arm      Esophageal Reflux      Helicobacter Pylori (H. Pylori) Infection      Atrial fibrillation      Mitral Regurgitation      Lumbar Spondylosis (L4 - L5)      Spinal Stenosis      Past Medical History:   Diagnosis Date     A-fib      Atrial fibrillation      CKD (chronic kidney disease)      Closed head injury      Dementia      Hallucinations, visual      HTN (hypertension)      Hypokalemia      Osteoporosis      Past Surgical History:   Procedure Laterality Date     GA APPENDECTOMY      Description: Appendectomy;  Recorded: 04/27/2007;     GA OPEN TREATMENT GREATER TROCHANTERIC FRACTURE      Description: Open Treatment Of A Fracture Of The Greater Trochanter;  Proc Date: 01/01/2014;     GA REMOVAL GALLBLADDER      Description: Cholecystectomy;  Recorded: 01/12/2009;     Social History     Social History     Marital status: Single     Spouse name: N/A     Number of children: N/A     Years of education: N/A     Occupational History     Not on file.     Social History Main Topics     Smoking status: Former Smoker     Smokeless tobacco: Never Used     Alcohol use No     Drug use: No     Sexual activity: Not on file     Other Topics Concern     Not on file     Social History Narrative       Meds:    Current Outpatient Prescriptions:      calcium carbonate-vitamin D3 (CALCIUM 600 + D,3,) 600 mg(1,500mg) -400 unit per tablet, Take 1 tablet by mouth 2 (two) times a day. Takes morning and 4 pm, Disp: , Rfl:      cephalexin (KEFLEX) 500 MG capsule, Take 1 capsule (500 mg total) by  mouth 2 (two) times a day for 7 days., Disp: 14 capsule, Rfl: 0     escitalopram oxalate (LEXAPRO) 10 MG tablet, Take 1 tablet (10 mg total) by mouth daily., Disp: 90 tablet, Rfl: 3     furosemide (LASIX) 40 MG tablet, Take 1 tablet (40 mg total) by mouth 2 (two) times a day. Take at 9 am and 4 pm, Disp: 60 tablet, Rfl: 0     loperamide (IMODIUM) 2 mg capsule, Take 1 capsule (2 mg total) by mouth 4 (four) times a day as needed for diarrhea., Disp: , Rfl: 0     metoprolol succinate (TOPROL-XL) 25 MG, Take 1 tablet (25 mg total) by mouth daily., Disp: 30 tablet, Rfl: 0     NIFEdipine (ADALAT CC) 30 MG 24 hr tablet, Take 30 mg by mouth daily. , Disp: , Rfl:      omeprazole (PRILOSEC) 20 MG capsule, Take 1 capsule (20 mg total) by mouth daily., Disp: 30 capsule, Rfl: 0     potassium chloride (MICRO-K) 10 mEq CR capsule, Take 1 capsule (10 mEq total) by mouth daily., Disp: 90 capsule, Rfl: 3     QUEtiapine (SEROQUEL) 25 MG tablet, Take 1 tablet (25 mg total) by mouth 3 (three) times a day. And see 25mg qd prn also, Disp: 120 tablet, Rfl: 0     simvastatin (ZOCOR) 40 MG tablet, Take 1 tablet (40 mg total) by mouth at bedtime., Disp: 30 tablet, Rfl: 0    Allergies:  Allergies   Allergen Reactions     Amlodipine Besylate      Annotation: Pt denies being allergic to any medications.         ROS:  Pertinent positives as noted in HPI; otherwise 12 point ROS negative.      Physical Exam:  EXAM:  /40 (Patient Site: Left Arm, Patient Position: Sitting, Cuff Size: Adult Regular)  Pulse 66  Temp 97.7  F (36.5  C) (Oral)   Resp 20  Wt 126 lb 7 oz (57.4 kg)  Breastfeeding? No  BMI 23.13 kg/m2   Gen:  NAD, appears well, well-hydrated, irritable - quick to challenge her daughter's history , although all in all she is a vague historian  HEENT:  TMs nl, oropharynx benign, nasal mucosa nl, conjunctiva clear  Neck:  Supple, no adenopathy, no thyromegaly, no carotid bruits, no JVD  Lungs:  Clear to auscultation bilaterally  Cor:   RRR no murmur  Abd:  Soft, nontender, BS+, no masses, no guarding or rebound, no HSM  Extr:  DJD - knees, hips, hands  Neuro:  No asymmetry, nonfocal; gait wobbly  Skin:  Warm/dry        Results:  Results for orders placed or performed during the hospital encounter of 02/09/18   Culture, Urine   Result Value Ref Range    Culture No Growth    Comprehensive Metabolic Panel   Result Value Ref Range    Sodium 142 136 - 145 mmol/L    Potassium 3.5 3.5 - 5.0 mmol/L    Chloride 105 98 - 107 mmol/L    CO2 26 22 - 31 mmol/L    Anion Gap, Calculation 11 5 - 18 mmol/L    Glucose 117 70 - 125 mg/dL    BUN 54 (H) 8 - 28 mg/dL    Creatinine 1.83 (H) 0.60 - 1.10 mg/dL    GFR MDRD Af Amer 31 (L) >60 mL/min/1.73m2    GFR MDRD Non Af Amer 26 (L) >60 mL/min/1.73m2    Bilirubin, Total 0.5 0.0 - 1.0 mg/dL    Calcium 9.1 8.5 - 10.5 mg/dL    Protein, Total 7.3 6.0 - 8.0 g/dL    Albumin 3.1 (L) 3.5 - 5.0 g/dL    Alkaline Phosphatase 82 45 - 120 U/L    AST 14 0 - 40 U/L    ALT 12 0 - 45 U/L   Lactic Acid   Result Value Ref Range    Lactic Acid 0.8 0.5 - 2.2 mmol/L   Thyroid Stimulating Hormone (TSH)   Result Value Ref Range    TSH 1.71 0.30 - 5.00 uIU/mL   Urinalysis-UC if Indicated   Result Value Ref Range    Color, UA Yellow Colorless, Yellow, Straw, Light Yellow    Clarity, UA Clear Clear    Glucose, UA Negative Negative    Bilirubin, UA Negative Negative    Ketones, UA Negative Negative, 60 mg/dL    Specific Gravity, UA 1.010 1.001 - 1.030    Blood, UA Negative Negative    pH, UA 5.0 4.5 - 8.0    Protein, UA Negative Negative mg/dL    Urobilinogen, UA <2.0 E.U./dL <2.0 E.U./dL, 2.0 E.U./dL    Nitrite, UA Negative Negative    Leukocytes, UA Trace (!) Negative    Bacteria, UA None Seen None Seen hpf    RBC, UA 0-2 None Seen, 0-2 hpf    WBC, UA 0-5 None Seen, 0-5 hpf    Squam Epithel, UA  (!) None Seen, 0-5 lpf    Mucus, UA Few (!) None Seen lpf    Hyaline Casts, UA 0-5 0-5, None Seen lpf   Blood Culture 1st   Result Value Ref Range     Anaerobic Blood Culture Bottle No Growth No Growth, No organisms seen, bottle returned to instrument, Specimen not received    Aerobic Blood Culture Bottle No Growth No Growth, No organisms seen, bottle returned to instrument, Specimen not received   HM1 (CBC with Diff)   Result Value Ref Range    WBC 5.7 4.0 - 11.0 thou/uL    RBC 3.76 (L) 3.80 - 5.40 mill/uL    Hemoglobin 10.6 (L) 12.0 - 16.0 g/dL    Hematocrit 32.6 (L) 35.0 - 47.0 %    MCV 87 80 - 100 fL    MCH 28.2 27.0 - 34.0 pg    MCHC 32.5 32.0 - 36.0 g/dL    RDW 14.7 (H) 11.0 - 14.5 %    Platelets 279 140 - 440 thou/uL    MPV 9.5 8.5 - 12.5 fL    Neutrophils % 77 (H) 50 - 70 %    Lymphocytes % 13 (L) 20 - 40 %    Monocytes % 9 2 - 10 %    Eosinophils % 1 0 - 6 %    Basophils % 0 0 - 2 %    Neutrophils Absolute 4.4 2.0 - 7.7 thou/uL    Lymphocytes Absolute 0.7 (L) 0.8 - 4.4 thou/uL    Monocytes Absolute 0.5 0.0 - 0.9 thou/uL    Eosinophils Absolute 0.1 0.0 - 0.4 thou/uL    Basophils Absolute 0.0 0.0 - 0.2 thou/uL

## 2021-06-17 NOTE — PROGRESS NOTES
ASSESSMENT/PLAN:  1. Late onset Alzheimer's disease with behavioral disturbance     2. Edema  furosemide (LASIX) 40 MG tablet   3. Hypertension  metoprolol succinate (TOPROL-XL) 25 MG   4. Hypokalemia     5. Situational depression  QUEtiapine (SEROQUEL) 25 MG tablet   6. Esophageal reflux  omeprazole (PRILOSEC) 20 MG capsule   7. Mixed hyperlipidemia  simvastatin (ZOCOR) 40 MG tablet   8. Diarrhea, unspecified type  loperamide (IMODIUM) 2 mg capsule       89 yo female - just discharged from nursing home facility - back to daughter's home.    We have reviewed diagnoses above.  We have reviewed notes from last hospitalization/ER visit as well as available information from her care facility.  Her daughter is concerned by medications that are not being given currently.  These include Nifedipine (likely held for low blood pressures) - we will continue to hold this.  She also has not been getting the calcium and Vitamin D.  We discussed that these could be continued if desired.  Other meds have been reconciled/reviewed.  Will refill as needed.      I have discussed this situation with patient and daughter.  They have more help in the home currently, and this is a good thing.  They are still waiting on confirmation of even more help.   We have discussed risk of further deterioration of this living arrangement.    Total time of visit = 30 minutes - over 50% in counseling time.        Medications Discontinued During This Encounter   Medication Reason     NIFEdipine (PROCARDIA XL) 30 MG 24 hr tablet Therapy completed     albuterol (PROVENTIL) 2.5 mg /3 mL (0.083 %) nebulizer solution Therapy completed     furosemide (LASIX) 40 MG tablet Reorder     loperamide (IMODIUM) 2 mg capsule Reorder     metoprolol succinate (TOPROL-XL) 25 MG Reorder     omeprazole (PRILOSEC) 20 MG capsule Reorder     QUEtiapine (SEROQUEL) 25 MG tablet Reorder     simvastatin (ZOCOR) 40 MG tablet Reorder     QUEtiapine (SEROQUEL) 25 MG tablet Duplicate  order     There are no Patient Instructions on file for this visit.    Chief Complaint:  Chief Complaint   Patient presents with     Follow-up     D/C from the [facility], They were not giving her calcium and Nifedipine       HPI:   Fang Villasenor is a 89 y.o. female c/o  Was discharged a week ago yesterday  Daughter is caring for her - granddaughter comes twice a week; someone from [facility] will come out and assess need for help  Moved upstairs; has an alarm on her bed - so far, no problems - sleeps all night  Had some med adjustments   Not taking Nifedipine  Wants to restart calcium    Appetite good  Weight up 4 pounds    Not on coumadin         PMH:   Patient Active Problem List    Diagnosis Date Noted     Acute cystitis without hematuria 02/03/2018     Dementia 02/02/2018     Closed head injury 01/26/2018     Diarrhea 11/13/2017     Hearing loss 11/08/2017     Hallucinations, visual 08/30/2017     Chronic anticoagulation 05/29/2017     Situational depression 07/01/2016     Decubitus ulcer, hip, left, stage I 02/14/2016     Unintentional weight loss 12/04/2015     Allergic rhinitis 05/18/2015     Seasonal allergies 05/11/2015     Closed Subtrochanteric Fracture Of The Left Femur      Onychomycosis Of The Toenails      Edema      Iron Deficiency      Anemia      Fibroadenoma Of The Breast      Cataract      Urinary Tract Infection      Atypical Chest Pain      Menopausal Disorder      Vitamin B12 Deficiency      Vitamin D Deficiency      Mixed hyperlipidemia      Hypokalemia      Hypertension      Chronic Kidney Disease, Stage 3      Osteoporosis      Open Wound Of The Forearm      A Fall      Open Wound Of The Right Upper Arm      Esophageal Reflux      Helicobacter Pylori (H. Pylori) Infection      Atrial fibrillation      Mitral Regurgitation      Lumbar Spondylosis (L4 - L5)      Spinal Stenosis      Past Medical History:   Diagnosis Date     A-fib      Atrial fibrillation      CKD (chronic kidney  disease)      Closed head injury      Hallucinations, visual      HTN (hypertension)      Hypokalemia      Osteoporosis      Past Surgical History:   Procedure Laterality Date     NM APPENDECTOMY      Description: Appendectomy;  Recorded: 04/27/2007;     NM OPEN TREATMENT GREATER TROCHANTERIC FRACTURE      Description: Open Treatment Of A Fracture Of The Greater Trochanter;  Proc Date: 01/01/2014;     NM REMOVAL GALLBLADDER      Description: Cholecystectomy;  Recorded: 01/12/2009;     Social History     Social History     Marital status: Single     Spouse name: N/A     Number of children: N/A     Years of education: N/A     Occupational History     Not on file.     Social History Main Topics     Smoking status: Former Smoker     Smokeless tobacco: Never Used     Alcohol use No     Drug use: No     Sexual activity: Not on file     Other Topics Concern     Not on file     Social History Narrative       Meds:    Current Outpatient Prescriptions:      calcium carbonate-vitamin D3 (CALCIUM 600 + D,3,) 600 mg(1,500mg) -400 unit per tablet, Take 1 tablet by mouth 2 (two) times a day. Takes morning and 4 pm, Disp: , Rfl:      escitalopram oxalate (LEXAPRO) 10 MG tablet, Take 1 tablet (10 mg total) by mouth daily., Disp: 90 tablet, Rfl: 3     furosemide (LASIX) 40 MG tablet, Take 1 tablet (40 mg total) by mouth 2 (two) times a day. Take at 9 am and 4 pm, Disp: 60 tablet, Rfl: 0     metoprolol succinate (TOPROL-XL) 25 MG, Take 1 tablet (25 mg total) by mouth daily., Disp: 30 tablet, Rfl: 0     omeprazole (PRILOSEC) 20 MG capsule, Take 1 capsule (20 mg total) by mouth daily., Disp: 30 capsule, Rfl: 0     potassium chloride (MICRO-K) 10 mEq CR capsule, Take 1 capsule (10 mEq total) by mouth daily., Disp: 90 capsule, Rfl: 3     QUEtiapine (SEROQUEL) 25 MG tablet, Take 1 tablet (25 mg total) by mouth 3 (three) times a day. And see 25mg qd prn also, Disp: 120 tablet, Rfl: 0     simvastatin (ZOCOR) 40 MG tablet, Take 1 tablet (40  mg total) by mouth at bedtime., Disp: 30 tablet, Rfl: 0     loperamide (IMODIUM) 2 mg capsule, Take 1 capsule (2 mg total) by mouth 4 (four) times a day as needed for diarrhea., Disp: , Rfl: 0    Allergies:  Allergies   Allergen Reactions     Amlodipine Besylate      Annotation: Pt denies being allergic to any medications.         ROS:  Pertinent positives as noted in HPI; otherwise 12 point ROS negative.      Physical Exam:  EXAM:  /64 (Patient Site: Left Arm, Patient Position: Sitting, Cuff Size: Adult Regular)  Pulse 60  Wt 125 lb 12.8 oz (57.1 kg)  BMI 23.01 kg/m2   Gen:  NAD, appears well, well-hydrated, vague historian (baseline confusion)  HEENT:  TMs nl, oropharynx benign, nasal mucosa nl, conjunctiva clear  Neck:  Supple, no adenopathy, no thyromegaly, no carotid bruits, no JVD  Lungs:  Clear to auscultation bilaterally  Cor:  RRR no murmur  Abd:  Soft, nontender, BS+, no masses, no guarding or rebound, no HSM, no CVAT  Extr:  Neg.  Neuro:  No asymmetry  Skin:  Warm/dry        Results:  Results for orders placed or performed during the hospital encounter of 02/09/18   Culture, Urine   Result Value Ref Range    Culture No Growth    Comprehensive Metabolic Panel   Result Value Ref Range    Sodium 142 136 - 145 mmol/L    Potassium 3.5 3.5 - 5.0 mmol/L    Chloride 105 98 - 107 mmol/L    CO2 26 22 - 31 mmol/L    Anion Gap, Calculation 11 5 - 18 mmol/L    Glucose 117 70 - 125 mg/dL    BUN 54 (H) 8 - 28 mg/dL    Creatinine 1.83 (H) 0.60 - 1.10 mg/dL    GFR MDRD Af Amer 31 (L) >60 mL/min/1.73m2    GFR MDRD Non Af Amer 26 (L) >60 mL/min/1.73m2    Bilirubin, Total 0.5 0.0 - 1.0 mg/dL    Calcium 9.1 8.5 - 10.5 mg/dL    Protein, Total 7.3 6.0 - 8.0 g/dL    Albumin 3.1 (L) 3.5 - 5.0 g/dL    Alkaline Phosphatase 82 45 - 120 U/L    AST 14 0 - 40 U/L    ALT 12 0 - 45 U/L   Lactic Acid   Result Value Ref Range    Lactic Acid 0.8 0.5 - 2.2 mmol/L   Thyroid Stimulating Hormone (TSH)   Result Value Ref Range    TSH  1.71 0.30 - 5.00 uIU/mL   Urinalysis-UC if Indicated   Result Value Ref Range    Color, UA Yellow Colorless, Yellow, Straw, Light Yellow    Clarity, UA Clear Clear    Glucose, UA Negative Negative    Bilirubin, UA Negative Negative    Ketones, UA Negative Negative, 60 mg/dL    Specific Gravity, UA 1.010 1.001 - 1.030    Blood, UA Negative Negative    pH, UA 5.0 4.5 - 8.0    Protein, UA Negative Negative mg/dL    Urobilinogen, UA <2.0 E.U./dL <2.0 E.U./dL, 2.0 E.U./dL    Nitrite, UA Negative Negative    Leukocytes, UA Trace (!) Negative    Bacteria, UA None Seen None Seen hpf    RBC, UA 0-2 None Seen, 0-2 hpf    WBC, UA 0-5 None Seen, 0-5 hpf    Squam Epithel, UA  (!) None Seen, 0-5 lpf    Mucus, UA Few (!) None Seen lpf    Hyaline Casts, UA 0-5 0-5, None Seen lpf   Blood Culture 1st   Result Value Ref Range    Anaerobic Blood Culture Bottle No Growth No Growth, No organisms seen, bottle returned to instrument, Specimen not received    Aerobic Blood Culture Bottle No Growth No Growth, No organisms seen, bottle returned to instrument, Specimen not received   HM1 (CBC with Diff)   Result Value Ref Range    WBC 5.7 4.0 - 11.0 thou/uL    RBC 3.76 (L) 3.80 - 5.40 mill/uL    Hemoglobin 10.6 (L) 12.0 - 16.0 g/dL    Hematocrit 32.6 (L) 35.0 - 47.0 %    MCV 87 80 - 100 fL    MCH 28.2 27.0 - 34.0 pg    MCHC 32.5 32.0 - 36.0 g/dL    RDW 14.7 (H) 11.0 - 14.5 %    Platelets 279 140 - 440 thou/uL    MPV 9.5 8.5 - 12.5 fL    Neutrophils % 77 (H) 50 - 70 %    Lymphocytes % 13 (L) 20 - 40 %    Monocytes % 9 2 - 10 %    Eosinophils % 1 0 - 6 %    Basophils % 0 0 - 2 %    Neutrophils Absolute 4.4 2.0 - 7.7 thou/uL    Lymphocytes Absolute 0.7 (L) 0.8 - 4.4 thou/uL    Monocytes Absolute 0.5 0.0 - 0.9 thou/uL    Eosinophils Absolute 0.1 0.0 - 0.4 thou/uL    Basophils Absolute 0.0 0.0 - 0.2 thou/uL

## 2021-06-18 NOTE — PROGRESS NOTES
[facility]    DATE: 2018    NAME:  Fang Villasenor             :  5/3/1922  MRN: [id number]  CODE STATUS:  FULL CODE    VISIT TYPE: ACUTE  FACILITY: [facility] [[id number]]     ROOM: 216    CHIEF COMPLAIN/REASON FOR VISIT:  Chief Complaint   Patient presents with     Problem Visit     Fall     HISTORY OF PRESENT ILLNESS: Fang Villasenor is a 89 y.o. female being seen at the request of the nurses for Fall.  Patient had a fall last night without any injures.  She is relevantly newer to our facility.  She has Dementia and is in a locked memory care unit.  Upon chart review, blood pressures are elevated with SBP as high as 174. She denies any headaches, chest pain, palpitations, lightheadedness, or dizziness.    Allergies   Allergen Reactions     Amlodipine Besylate      Annotation: Pt denies being allergic to any medications.     :     Current Outpatient Prescriptions   Medication Sig     divalproex (DEPAKOTE SPRINKLE) 125 mg capsule Take 125 mg by mouth 3 (three) times a day.     DULoxetine (CYMBALTA) 30 MG capsule Take 30 mg by mouth daily.     furosemide (LASIX) 40 MG tablet Take 1 tablet (40 mg total) by mouth 2 (two) times a day. Take at 9 am and 4 pm     gabapentin (NEURONTIN) 100 MG capsule Take 100 mg by mouth 3 (three) times a day.     loperamide (IMODIUM) 2 mg capsule Take 1 capsule (2 mg total) by mouth 4 (four) times a day as needed for diarrhea.     metoprolol succinate (TOPROL-XL) 25 MG Take 1 tablet (25 mg total) by mouth daily.     NIFEdipine (ADALAT CC) 30 MG 24 hr tablet Take 30 mg by mouth daily.      omeprazole (PRILOSEC) 20 MG capsule Take 1 capsule (20 mg total) by mouth daily.     potassium chloride (K-DUR,KLOR-CON) 10 MEQ tablet Take 10 mEq by mouth 2 (two) times a day.     risperiDONE (RISPERDAL) 0.25 MG tablet Take 0.25 mg by mouth 2 (two) times a day. And 0.25 mg every 4 hours as needed     traZODone (DESYREL) 50 MG tablet Take 50 mg by mouth  at bedtime as needed, may repeat once for sleep.     REVIEW OF SYSTEMS:    Currently, no fever, chills, or rigors. Does not have any visual or hearing problems. Denies any chest pain, headaches, palpitations, lightheadedness, dizziness, shortness of breath, or cough. Appetite is good. Denies any GERD symptoms. Denies any difficulty with swallowing, nausea, or vomiting.  Denies any abdominal pain, diarrhea or constipation. Denies any urinary symptoms. No insomnia. No active bleeding. No rash.     PHYSICAL EXAMINATION:  Vitals:    06/06/18 0911   BP: 174/85   Pulse: 62   Resp: 18   Temp: (!) 95.5  F (35.3  C)   SpO2: 97%   Weight: 142 lb 6.4 oz (64.6 kg)     GENERAL: Awake, Alert, oriented x3, not in any form of acute distress, answers questions appropriately, follows simple commands, conversant  HEENT: Head is normocephalic with normal hair distribution. No evidence of trauma. Ears: No acute purulent discharge. Eyes: Conjunctivae pink with no scleral jaundice. Nose: Normal mucosa and septum. NECK: Supple with no cervical or supraclavicular lymphadenopathy. Trachea is midline.   CHEST: No tenderness or deformity, no crepitus  LUNG: Clear to auscultation with good chest expansion. There are no crackles or wheezes, normal AP diameter.  BACK: No kyphosis of the thoracic spine. Symmetric, no curvature, ROM normal, no CVA tenderness, no spinal tenderness   CVS: There is good S1  S2, there are no murmurs, rubs, gallops, or heaves, rhythm is regular,  2+ pulses symmetric in all extremities.  ABDOMEN: Globular and soft, nontender to palpation, non distended, no masses, no organomegaly, good bowel sounds, no rebound or guarding, no peritoneal signs.   EXTREMITIES:  Full range of motion on both upper and lower extremities, there is no tenderness to palpation, no pedal edema, no cyanosis or clubbing, no calf tenderness.  Pulses equal in all extremities, normal cap refill, no joint swelling.  SKIN: Warm and dry, no erythema  noted.  Skin color, texture, no rashes or lesions.  NEUROLOGICAL: The patient is oriented to person, place and time. Strength and sensation are grossly intact. Face is symmetric.    LABS:    Lab Results   Component Value Date    WBC 6.8 05/13/2018    HGB 11.1 (L) 05/13/2018    HCT 34.9 (L) 05/13/2018    MCV 88 05/13/2018     05/13/2018     Results for orders placed or performed during the hospital encounter of 05/13/18   Basic Metabolic Panel   Result Value Ref Range    Sodium 142 136 - 145 mmol/L    Potassium 4.0 3.5 - 5.0 mmol/L    Chloride 103 98 - 107 mmol/L    CO2 31 22 - 31 mmol/L    Anion Gap, Calculation 8 5 - 18 mmol/L    Glucose 100 70 - 125 mg/dL    Calcium 8.6 8.5 - 10.5 mg/dL    BUN 31 (H) 8 - 28 mg/dL    Creatinine 1.41 (H) 0.60 - 1.10 mg/dL    GFR MDRD Af Amer 42 (L) >60 mL/min/1.73m2    GFR MDRD Non Af Amer 35 (L) >60 mL/min/1.73m2     Vitamin D, Total (25-Hydroxy)   Date Value Ref Range Status   08/30/2017 65.7 30.0 - 80.0 ng/mL Final     Lab Results   Component Value Date    [id number] 526 08/30/2017       ASSESSMENT/PLAN:    1. Late onset Alzheimer's disease with behavioral disturbance - Neurologically stable   2. Chronic atrial fibrillation - Rate controlled on Metoprolol   3. Hypertension - Blood pressures are elevated, will increse Metoprolol to 25 mg two times a day and check BMP in 1 week   4. Fall - last fall 5/29/2018 without any injuries   5. Hearing loss - Ongoing             Electronically signed by:  Nick Hanson CNP    35 minutes spent of which greater than 50% was face to face communication with the patient about above plan of care
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
## 2021-06-19 NOTE — PROGRESS NOTES
[facility]    DATE: 2018    NAME:  Fang Villasenor             :  5/3/1922  MRN: [id number]  CODE STATUS:  FULL CODE    VISIT TYPE: ACUTE  FACILITY: [facility] [[id number]]     ROOM: 216    CHIEF COMPLAIN/REASON FOR VISIT:  Chief Complaint   Patient presents with     Review Of Multiple Medical Conditions     HISTORY OF PRESENT ILLNESS: Fang Villasenor is a 89 y.o. female with Dementia, Depression, Hypertension, GERD, and Insomnia being seen in the long term care facility for follow up and management of multiple chronic medical conditions.  She has Dementia and is in a locked memory care unit.  Hypertension - blood pressures have improved since increasing Metoprolol.  There is no evidence of headaches, chest pain, alpitations, lightheadedness, or dizziness.  Nursing staff has no concerns at this time.    Allergies   Allergen Reactions     Amlodipine Besylate      Annotation: Pt denies being allergic to any medications.     :     Current Outpatient Prescriptions   Medication Sig     divalproex (DEPAKOTE SPRINKLE) 125 mg capsule Take 125 mg by mouth 3 (three) times a day.     DULoxetine (CYMBALTA) 30 MG capsule Take 30 mg by mouth daily.     furosemide (LASIX) 40 MG tablet Take 1 tablet (40 mg total) by mouth 2 (two) times a day. Take at 9 am and 4 pm     gabapentin (NEURONTIN) 100 MG capsule Take 100 mg by mouth 3 (three) times a day.     loperamide (IMODIUM) 2 mg capsule Take 1 capsule (2 mg total) by mouth 4 (four) times a day as needed for diarrhea.     metoprolol succinate (TOPROL-XL) 25 MG Take 1 tablet (25 mg total) by mouth daily.     NIFEdipine (ADALAT CC) 30 MG 24 hr tablet Take 30 mg by mouth daily.      omeprazole (PRILOSEC) 20 MG capsule Take 1 capsule (20 mg total) by mouth daily.     potassium chloride (K-DUR,KLOR-CON) 10 MEQ tablet Take 10 mEq by mouth 2 (two) times a day.     risperiDONE (RISPERDAL) 0.25 MG tablet Take 0.25 mg by mouth 2 (two) times a  day. And 0.25 mg every 4 hours as needed     traZODone (DESYREL) 50 MG tablet Take 50 mg by mouth at bedtime as needed, may repeat once for sleep.     REVIEW OF SYSTEMS:    Currently, no fever, chills, or rigors. Does not have any visual or hearing problems. Denies any chest pain, headaches, palpitations, lightheadedness, dizziness, shortness of breath, or cough. Appetite is good. Denies any GERD symptoms. Denies any difficulty with swallowing, nausea, or vomiting.  Denies any abdominal pain, diarrhea or constipation. Denies any urinary symptoms. No insomnia. No active bleeding. No rash.     PHYSICAL EXAMINATION:  Vitals:    07/07/18 0012   BP: 121/47   Pulse: 70   Resp: 18   Temp: (!) 94.6  F (34.8  C)   SpO2: 95%   Weight: 145 lb 12.8 oz (66.1 kg)     GENERAL: Awake, Alert, oriented x3, not in any form of acute distress, answers questions appropriately, follows simple commands, conversant  HEENT: Head is normocephalic with normal hair distribution. No evidence of trauma. Ears: No acute purulent discharge. Eyes: Conjunctivae pink with no scleral jaundice. Nose: Normal mucosa and septum. NECK: Supple with no cervical or supraclavicular lymphadenopathy. Trachea is midline.   CHEST: No tenderness or deformity, no crepitus  LUNG: Clear to auscultation with good chest expansion. There are no crackles or wheezes, normal AP diameter.  BACK: No kyphosis of the thoracic spine. Symmetric, no curvature, ROM normal, no CVA tenderness, no spinal tenderness   CVS: There is good S1  S2, there are no murmurs, rubs, gallops, or heaves, rhythm is regular,  2+ pulses symmetric in all extremities.  ABDOMEN: Globular and soft, nontender to palpation, non distended, no masses, no organomegaly, good bowel sounds, no rebound or guarding, no peritoneal signs.   EXTREMITIES:  Full range of motion on both upper and lower extremities, there is no tenderness to palpation, no pedal edema, no cyanosis or clubbing, no calf tenderness.  Pulses  equal in all extremities, normal cap refill, no joint swelling.  SKIN: Warm and dry, no erythema noted.  Skin color, texture, no rashes or lesions.  NEUROLOGICAL: The patient is oriented to person, place and time. Strength and sensation are grossly intact. Face is symmetric.    LABS:    Lab Results   Component Value Date    WBC 6.8 05/13/2018    HGB 11.1 (L) 05/13/2018    HCT 34.9 (L) 05/13/2018    MCV 88 05/13/2018     05/13/2018     Results for orders placed or performed during the hospital encounter of 05/13/18   Basic Metabolic Panel   Result Value Ref Range    Sodium 142 136 - 145 mmol/L    Potassium 4.0 3.5 - 5.0 mmol/L    Chloride 103 98 - 107 mmol/L    CO2 31 22 - 31 mmol/L    Anion Gap, Calculation 8 5 - 18 mmol/L    Glucose 100 70 - 125 mg/dL    Calcium 8.6 8.5 - 10.5 mg/dL    BUN 31 (H) 8 - 28 mg/dL    Creatinine 1.41 (H) 0.60 - 1.10 mg/dL    GFR MDRD Af Amer 42 (L) >60 mL/min/1.73m2    GFR MDRD Non Af Amer 35 (L) >60 mL/min/1.73m2     Vitamin D, Total (25-Hydroxy)   Date Value Ref Range Status   08/30/2017 65.7 30.0 - 80.0 ng/mL Final     Lab Results   Component Value Date    PYGQKCDM40 526 08/30/2017       ASSESSMENT/PLAN:    1. Hypertension - Blood pressures within target range, will continue Lasix, Nifedipine, and  Metoprolol to 25 mg two times a day    2. Chronic atrial fibrillation (H) - Rate controlled on Metoprolol   3. Depression - Mood stable with  Duloxetine   4. GERD (gastroesophageal reflux disease) - Continue Omeprazole   5. Insomnia - Continue Trazadone       CARE PLAN: The care plan has been reviewed and discussed with patient and nursing staff. No changes made at this time.  We will continue to monitor.            Electronically signed by:  Nick Hanson CNP

## 2021-06-19 NOTE — PROGRESS NOTES
[facility]    Facility[facility]   [facility] [[id number]]   Code Status: DNR/DNI      CHIEF COMPLAINT/REASON FOR VISIT:  Chief Complaint   Patient presents with     Problem Visit     hallucinations       HISTORY:      HPI: Fang is a 89 y.o. female whom I was asked to see on an urgent basis today due to increasing hallucinations, angry outbursts, and insomnia.  Both nursing staff and family have noticed this trend especially in the last few days.  She basically has not been sleeping at night.  She has been refusing medications in the evening as well.  Her daughter accompanies her today and requests an adjustment of medication to help the situation.      She does have underlying medical conditions of late onset Alzheimer's dementia, hallucinations, atrial fibrillation, chronic kidney disease, and history of closed head injury.    Upon current review of systems, she is not having fevers nor chills nor sore throat or congestion nor cough or shortness of breath or chest pain or abdominal pain or nausea and no dysuria.    Past Medical History:   Diagnosis Date     Atrial fibrillation (H)      CKD (chronic kidney disease)      Closed head injury      Dementia      Hallucinations, visual      HLD (hyperlipidemia)      HTN (hypertension)      Hypokalemia      Lumbar spondylosis      Osteoporosis              No family history on file.  Social History     Social History     Marital status: Single     Spouse name: N/A     Number of children: N/A     Years of education: N/A     Social History Main Topics     Smoking status: Former Smoker     Smokeless tobacco: Never Used     Alcohol use No     Drug use: No     Sexual activity: Not on file     Other Topics Concern     Not on file     Social History Narrative         Review of Systems    .  Vitals:    07/06/18 1244   BP: 126/55   Pulse: 76   Resp: 18   Temp: (!) 96.5  F (35.8  C)   SpO2: 97%       Physical Exam   Constitutional: No distress.   She  is sitting outside with her daughter   HENT:   Nose: Nose normal.   Eyes: Right eye exhibits no discharge. Left eye exhibits no discharge.   Neck: No JVD present.   Cardiovascular: Normal heart sounds.    Pulmonary/Chest: Breath sounds normal. No respiratory distress.   Abdominal: There is no tenderness.   Musculoskeletal: She exhibits no edema.   Neurological: She is alert.   Psychiatric: She has a normal mood and affect.   Nursing note and vitals reviewed.        LABS:   Blood glucose is 115    ASSESSMENT:      ICD-10-CM    1. Hallucinations, visual R44.1    2. Late onset Alzheimer's disease with behavioral disturbance G30.1     F02.81    3. Insomnia due to other mental disorder F51.05     F99        PLAN:    Continue with current dosing of Risperdal 0.25 mg every 4 hours as needed hallucinations or agitation.  Double the dosing of Risperdal scheduled to be 0.5 mg twice daily.  Double the dosing for trazodone scheduled to be 100 mg nightly      Electronically signed by: Mak Pedraza MD

## 2021-07-13 ENCOUNTER — RECORDS - HEALTHEAST (OUTPATIENT)
Dept: ADMINISTRATIVE | Facility: CLINIC | Age: 86
End: 2021-07-13

## 2021-07-21 ENCOUNTER — RECORDS - HEALTHEAST (OUTPATIENT)
Dept: ADMINISTRATIVE | Facility: CLINIC | Age: 86
End: 2021-07-21